# Patient Record
Sex: MALE | Race: ASIAN | NOT HISPANIC OR LATINO | URBAN - METROPOLITAN AREA
[De-identification: names, ages, dates, MRNs, and addresses within clinical notes are randomized per-mention and may not be internally consistent; named-entity substitution may affect disease eponyms.]

---

## 2023-10-20 ENCOUNTER — INPATIENT (INPATIENT)
Facility: HOSPITAL | Age: 38
LOS: 2 days | Discharge: HOME | End: 2023-10-23
Attending: INTERNAL MEDICINE | Admitting: INTERNAL MEDICINE
Payer: COMMERCIAL

## 2023-10-20 VITALS
DIASTOLIC BLOOD PRESSURE: 73 MMHG | TEMPERATURE: 98 F | HEART RATE: 114 BPM | OXYGEN SATURATION: 99 % | SYSTOLIC BLOOD PRESSURE: 137 MMHG | RESPIRATION RATE: 18 BRPM

## 2023-10-20 DIAGNOSIS — E11.9 TYPE 2 DIABETES MELLITUS WITHOUT COMPLICATIONS: ICD-10-CM

## 2023-10-20 DIAGNOSIS — D64.9 ANEMIA, UNSPECIFIED: ICD-10-CM

## 2023-10-20 DIAGNOSIS — H00.019 HORDEOLUM EXTERNUM UNSPECIFIED EYE, UNSPECIFIED EYELID: ICD-10-CM

## 2023-10-20 DIAGNOSIS — E78.5 HYPERLIPIDEMIA, UNSPECIFIED: ICD-10-CM

## 2023-10-20 DIAGNOSIS — K92.2 GASTROINTESTINAL HEMORRHAGE, UNSPECIFIED: ICD-10-CM

## 2023-10-20 DIAGNOSIS — Z29.9 ENCOUNTER FOR PROPHYLACTIC MEASURES, UNSPECIFIED: ICD-10-CM

## 2023-10-20 LAB
ALBUMIN SERPL ELPH-MCNC: 4.8 G/DL — SIGNIFICANT CHANGE UP (ref 3.3–5)
ALBUMIN SERPL ELPH-MCNC: 4.8 G/DL — SIGNIFICANT CHANGE UP (ref 3.3–5)
ALP SERPL-CCNC: 107 U/L — SIGNIFICANT CHANGE UP (ref 40–120)
ALP SERPL-CCNC: 107 U/L — SIGNIFICANT CHANGE UP (ref 40–120)
ALT FLD-CCNC: 16 U/L — SIGNIFICANT CHANGE UP (ref 4–41)
ALT FLD-CCNC: 16 U/L — SIGNIFICANT CHANGE UP (ref 4–41)
ANION GAP SERPL CALC-SCNC: 9 MMOL/L — SIGNIFICANT CHANGE UP (ref 7–14)
ANION GAP SERPL CALC-SCNC: 9 MMOL/L — SIGNIFICANT CHANGE UP (ref 7–14)
APTT BLD: 27.2 SEC — SIGNIFICANT CHANGE UP (ref 24.5–35.6)
APTT BLD: 27.2 SEC — SIGNIFICANT CHANGE UP (ref 24.5–35.6)
AST SERPL-CCNC: 20 U/L — SIGNIFICANT CHANGE UP (ref 4–40)
AST SERPL-CCNC: 20 U/L — SIGNIFICANT CHANGE UP (ref 4–40)
BASOPHILS # BLD AUTO: 0 K/UL — SIGNIFICANT CHANGE UP (ref 0–0.2)
BASOPHILS # BLD AUTO: 0 K/UL — SIGNIFICANT CHANGE UP (ref 0–0.2)
BASOPHILS NFR BLD AUTO: 0 % — SIGNIFICANT CHANGE UP (ref 0–2)
BASOPHILS NFR BLD AUTO: 0 % — SIGNIFICANT CHANGE UP (ref 0–2)
BILIRUB SERPL-MCNC: 0.3 MG/DL — SIGNIFICANT CHANGE UP (ref 0.2–1.2)
BILIRUB SERPL-MCNC: 0.3 MG/DL — SIGNIFICANT CHANGE UP (ref 0.2–1.2)
BLD GP AB SCN SERPL QL: NEGATIVE — SIGNIFICANT CHANGE UP
BLD GP AB SCN SERPL QL: NEGATIVE — SIGNIFICANT CHANGE UP
BUN SERPL-MCNC: 14 MG/DL — SIGNIFICANT CHANGE UP (ref 7–23)
BUN SERPL-MCNC: 14 MG/DL — SIGNIFICANT CHANGE UP (ref 7–23)
CALCIUM SERPL-MCNC: 9.2 MG/DL — SIGNIFICANT CHANGE UP (ref 8.4–10.5)
CALCIUM SERPL-MCNC: 9.2 MG/DL — SIGNIFICANT CHANGE UP (ref 8.4–10.5)
CHLORIDE SERPL-SCNC: 100 MMOL/L — SIGNIFICANT CHANGE UP (ref 98–107)
CHLORIDE SERPL-SCNC: 100 MMOL/L — SIGNIFICANT CHANGE UP (ref 98–107)
CO2 SERPL-SCNC: 26 MMOL/L — SIGNIFICANT CHANGE UP (ref 22–31)
CO2 SERPL-SCNC: 26 MMOL/L — SIGNIFICANT CHANGE UP (ref 22–31)
CREAT SERPL-MCNC: 0.59 MG/DL — SIGNIFICANT CHANGE UP (ref 0.5–1.3)
CREAT SERPL-MCNC: 0.59 MG/DL — SIGNIFICANT CHANGE UP (ref 0.5–1.3)
EGFR: 128 ML/MIN/1.73M2 — SIGNIFICANT CHANGE UP
EGFR: 128 ML/MIN/1.73M2 — SIGNIFICANT CHANGE UP
EOSINOPHIL # BLD AUTO: 0.35 K/UL — SIGNIFICANT CHANGE UP (ref 0–0.5)
EOSINOPHIL # BLD AUTO: 0.35 K/UL — SIGNIFICANT CHANGE UP (ref 0–0.5)
EOSINOPHIL NFR BLD AUTO: 4.3 % — SIGNIFICANT CHANGE UP (ref 0–6)
EOSINOPHIL NFR BLD AUTO: 4.3 % — SIGNIFICANT CHANGE UP (ref 0–6)
FERRITIN SERPL-MCNC: 9 NG/ML — LOW (ref 30–400)
FERRITIN SERPL-MCNC: 9 NG/ML — LOW (ref 30–400)
GLUCOSE BLDC GLUCOMTR-MCNC: 130 MG/DL — HIGH (ref 70–99)
GLUCOSE BLDC GLUCOMTR-MCNC: 130 MG/DL — HIGH (ref 70–99)
GLUCOSE BLDC GLUCOMTR-MCNC: 141 MG/DL — HIGH (ref 70–99)
GLUCOSE BLDC GLUCOMTR-MCNC: 141 MG/DL — HIGH (ref 70–99)
GLUCOSE SERPL-MCNC: 181 MG/DL — HIGH (ref 70–99)
GLUCOSE SERPL-MCNC: 181 MG/DL — HIGH (ref 70–99)
HAPTOGLOB SERPL-MCNC: 168 MG/DL — SIGNIFICANT CHANGE UP (ref 34–200)
HAPTOGLOB SERPL-MCNC: 168 MG/DL — SIGNIFICANT CHANGE UP (ref 34–200)
HCT VFR BLD CALC: 25.1 % — LOW (ref 39–50)
HCT VFR BLD CALC: 25.1 % — LOW (ref 39–50)
HGB BLD-MCNC: 7.3 G/DL — LOW (ref 13–17)
HGB BLD-MCNC: 7.3 G/DL — LOW (ref 13–17)
IANC: 5.52 K/UL — SIGNIFICANT CHANGE UP (ref 1.8–7.4)
IANC: 5.52 K/UL — SIGNIFICANT CHANGE UP (ref 1.8–7.4)
INR BLD: 1.06 RATIO — SIGNIFICANT CHANGE UP (ref 0.85–1.18)
INR BLD: 1.06 RATIO — SIGNIFICANT CHANGE UP (ref 0.85–1.18)
IRON SATN MFR SERPL: 16 UG/DL — LOW (ref 45–165)
IRON SATN MFR SERPL: 16 UG/DL — LOW (ref 45–165)
IRON SATN MFR SERPL: 4 % — LOW (ref 14–50)
IRON SATN MFR SERPL: 4 % — LOW (ref 14–50)
LYMPHOCYTES # BLD AUTO: 1.43 K/UL — SIGNIFICANT CHANGE UP (ref 1–3.3)
LYMPHOCYTES # BLD AUTO: 1.43 K/UL — SIGNIFICANT CHANGE UP (ref 1–3.3)
LYMPHOCYTES # BLD AUTO: 17.4 % — SIGNIFICANT CHANGE UP (ref 13–44)
LYMPHOCYTES # BLD AUTO: 17.4 % — SIGNIFICANT CHANGE UP (ref 13–44)
MCHC RBC-ENTMCNC: 21.5 PG — LOW (ref 27–34)
MCHC RBC-ENTMCNC: 21.5 PG — LOW (ref 27–34)
MCHC RBC-ENTMCNC: 29.1 GM/DL — LOW (ref 32–36)
MCHC RBC-ENTMCNC: 29.1 GM/DL — LOW (ref 32–36)
MCV RBC AUTO: 74 FL — LOW (ref 80–100)
MCV RBC AUTO: 74 FL — LOW (ref 80–100)
MONOCYTES # BLD AUTO: 0.21 K/UL — SIGNIFICANT CHANGE UP (ref 0–0.9)
MONOCYTES # BLD AUTO: 0.21 K/UL — SIGNIFICANT CHANGE UP (ref 0–0.9)
MONOCYTES NFR BLD AUTO: 2.6 % — SIGNIFICANT CHANGE UP (ref 2–14)
MONOCYTES NFR BLD AUTO: 2.6 % — SIGNIFICANT CHANGE UP (ref 2–14)
NEUTROPHILS # BLD AUTO: 6.13 K/UL — SIGNIFICANT CHANGE UP (ref 1.8–7.4)
NEUTROPHILS # BLD AUTO: 6.13 K/UL — SIGNIFICANT CHANGE UP (ref 1.8–7.4)
NEUTROPHILS NFR BLD AUTO: 74.8 % — SIGNIFICANT CHANGE UP (ref 43–77)
NEUTROPHILS NFR BLD AUTO: 74.8 % — SIGNIFICANT CHANGE UP (ref 43–77)
PLATELET # BLD AUTO: 216 K/UL — SIGNIFICANT CHANGE UP (ref 150–400)
PLATELET # BLD AUTO: 216 K/UL — SIGNIFICANT CHANGE UP (ref 150–400)
POTASSIUM SERPL-MCNC: 3.8 MMOL/L — SIGNIFICANT CHANGE UP (ref 3.5–5.3)
POTASSIUM SERPL-MCNC: 3.8 MMOL/L — SIGNIFICANT CHANGE UP (ref 3.5–5.3)
POTASSIUM SERPL-SCNC: 3.8 MMOL/L — SIGNIFICANT CHANGE UP (ref 3.5–5.3)
POTASSIUM SERPL-SCNC: 3.8 MMOL/L — SIGNIFICANT CHANGE UP (ref 3.5–5.3)
PROT SERPL-MCNC: 7.9 G/DL — SIGNIFICANT CHANGE UP (ref 6–8.3)
PROT SERPL-MCNC: 7.9 G/DL — SIGNIFICANT CHANGE UP (ref 6–8.3)
PROTHROM AB SERPL-ACNC: 11.9 SEC — SIGNIFICANT CHANGE UP (ref 9.5–13)
PROTHROM AB SERPL-ACNC: 11.9 SEC — SIGNIFICANT CHANGE UP (ref 9.5–13)
RBC # BLD: 3.39 M/UL — LOW (ref 4.2–5.8)
RBC # BLD: 3.39 M/UL — LOW (ref 4.2–5.8)
RBC # FLD: 17.3 % — HIGH (ref 10.3–14.5)
RBC # FLD: 17.3 % — HIGH (ref 10.3–14.5)
RH IG SCN BLD-IMP: POSITIVE — SIGNIFICANT CHANGE UP
SODIUM SERPL-SCNC: 135 MMOL/L — SIGNIFICANT CHANGE UP (ref 135–145)
SODIUM SERPL-SCNC: 135 MMOL/L — SIGNIFICANT CHANGE UP (ref 135–145)
TIBC SERPL-MCNC: 453 UG/DL — HIGH (ref 220–430)
TIBC SERPL-MCNC: 453 UG/DL — HIGH (ref 220–430)
UIBC SERPL-MCNC: 437 UG/DL — HIGH (ref 110–370)
UIBC SERPL-MCNC: 437 UG/DL — HIGH (ref 110–370)
WBC # BLD: 8.2 K/UL — SIGNIFICANT CHANGE UP (ref 3.8–10.5)
WBC # BLD: 8.2 K/UL — SIGNIFICANT CHANGE UP (ref 3.8–10.5)
WBC # FLD AUTO: 8.2 K/UL — SIGNIFICANT CHANGE UP (ref 3.8–10.5)
WBC # FLD AUTO: 8.2 K/UL — SIGNIFICANT CHANGE UP (ref 3.8–10.5)

## 2023-10-20 PROCEDURE — 99222 1ST HOSP IP/OBS MODERATE 55: CPT

## 2023-10-20 PROCEDURE — 99223 1ST HOSP IP/OBS HIGH 75: CPT

## 2023-10-20 PROCEDURE — 99291 CRITICAL CARE FIRST HOUR: CPT

## 2023-10-20 RX ORDER — ERYTHROMYCIN BASE 5 MG/GRAM
1 OINTMENT (GRAM) OPHTHALMIC (EYE)
Refills: 0 | Status: DISCONTINUED | OUTPATIENT
Start: 2023-10-20 | End: 2023-10-23

## 2023-10-20 RX ORDER — FERROUS SULFATE 325(65) MG
325 TABLET ORAL DAILY
Refills: 0 | Status: DISCONTINUED | OUTPATIENT
Start: 2023-10-20 | End: 2023-10-21

## 2023-10-20 RX ORDER — EMPAGLIFLOZIN 10 MG/1
1 TABLET, FILM COATED ORAL
Refills: 0 | DISCHARGE

## 2023-10-20 RX ORDER — GLUCAGON INJECTION, SOLUTION 0.5 MG/.1ML
1 INJECTION, SOLUTION SUBCUTANEOUS ONCE
Refills: 0 | Status: DISCONTINUED | OUTPATIENT
Start: 2023-10-20 | End: 2023-10-23

## 2023-10-20 RX ORDER — DEXTROSE 50 % IN WATER 50 %
12.5 SYRINGE (ML) INTRAVENOUS ONCE
Refills: 0 | Status: DISCONTINUED | OUTPATIENT
Start: 2023-10-20 | End: 2023-10-23

## 2023-10-20 RX ORDER — DEXTROSE 50 % IN WATER 50 %
25 SYRINGE (ML) INTRAVENOUS ONCE
Refills: 0 | Status: DISCONTINUED | OUTPATIENT
Start: 2023-10-20 | End: 2023-10-23

## 2023-10-20 RX ORDER — DEXTROSE 50 % IN WATER 50 %
15 SYRINGE (ML) INTRAVENOUS ONCE
Refills: 0 | Status: DISCONTINUED | OUTPATIENT
Start: 2023-10-20 | End: 2023-10-23

## 2023-10-20 RX ORDER — INSULIN LISPRO 100/ML
VIAL (ML) SUBCUTANEOUS AT BEDTIME
Refills: 0 | Status: DISCONTINUED | OUTPATIENT
Start: 2023-10-20 | End: 2023-10-23

## 2023-10-20 RX ORDER — SODIUM CHLORIDE 9 MG/ML
1000 INJECTION INTRAMUSCULAR; INTRAVENOUS; SUBCUTANEOUS ONCE
Refills: 0 | Status: COMPLETED | OUTPATIENT
Start: 2023-10-20 | End: 2023-10-20

## 2023-10-20 RX ORDER — SITAGLIPTIN AND METFORMIN HYDROCHLORIDE 500; 50 MG/1; MG/1
1 TABLET, FILM COATED ORAL
Refills: 0 | DISCHARGE

## 2023-10-20 RX ORDER — ATORVASTATIN CALCIUM 80 MG/1
40 TABLET, FILM COATED ORAL AT BEDTIME
Refills: 0 | Status: DISCONTINUED | OUTPATIENT
Start: 2023-10-20 | End: 2023-10-23

## 2023-10-20 RX ORDER — INSULIN LISPRO 100/ML
VIAL (ML) SUBCUTANEOUS
Refills: 0 | Status: DISCONTINUED | OUTPATIENT
Start: 2023-10-20 | End: 2023-10-23

## 2023-10-20 RX ORDER — ROSUVASTATIN CALCIUM 5 MG/1
1 TABLET ORAL
Refills: 0 | DISCHARGE

## 2023-10-20 RX ADMIN — SODIUM CHLORIDE 1000 MILLILITER(S): 9 INJECTION INTRAMUSCULAR; INTRAVENOUS; SUBCUTANEOUS at 12:37

## 2023-10-20 RX ADMIN — ATORVASTATIN CALCIUM 40 MILLIGRAM(S): 80 TABLET, FILM COATED ORAL at 22:43

## 2023-10-20 NOTE — ED PROVIDER NOTE - PROGRESS NOTE DETAILS
ROBERTA Urbano: Pt consented for blood transfusion given Hgb 7.3, tachycardia and concern for GI bleed. GI emailed for consult. Plan for admission

## 2023-10-20 NOTE — H&P ADULT - HISTORY OF PRESENT ILLNESS
38-year-old male with PMH of DM2, HLD sent to the ED for hemoglobin 7.6.  Pt states he has been having BRBPR intermittently for apx 5 months. Typically sees blood w/ wiping and in the toilet bowl. Denies pain w/  BM or w/ bleeding episodes. Reports hx of hemorroids which pt attributed sx to. Was evaluated by outaptient provider last month, and was found to have iron def anemia (Hgb apx 9 at that time). Started on iron supplementation. Re-evaluated by GI (Dr. Gallegos) yesterday given persistent episodes of BRBPR, Blood work obtained and pt planned for outpatient colonoscopy 10/25. States he was called and asked to come in to the E.D. for CBC sig for Hgb 7.6 down from 9. At this time pt denies hemoptysis/hematemesis, dizziness,lightheadedness, chest pain or SOB, constipation, diarrhea. Noted L lower eye lid swelling yesterday w. some pain/TTP. ROS otherwise negative.    In the E.D, pt hemodynamically stable. Hgb 7.3. GI consulted. Admitted to medicine for further mgt.

## 2023-10-20 NOTE — H&P ADULT - NSHPREVIEWOFSYSTEMS_GEN_ALL_CORE
CONSTITUTIONAL:  No weight loss, fever, chills, weakness or fatigue.  HEENT:    No visual loss, blurred vision, No hearing loss, sneezing, congestion, runny nose or sore throat.  SKIN:  No rash or itching.  CARDIOVASCULAR:  No chest pain or chest discomfort. No palpitations.  RESPIRATORY:  No shortness of breath, cough or sputum.  GASTROINTESTINAL:  No nausea, vomiting or diarrhea. No abdominal pain, +BRBPR  GENITOURINARY:  Denies hematuria, dysuria.   NEUROLOGICAL:  No headache, dizziness, numbness or tingling in the extremities. No change in bowel or bladder control.  MUSCULOSKELETAL:  No muscle, back pain, joint pain or stiffness.  HEMATOLOGIC:  No  bleeding or bruising.  ENDOCRINOLOGIC:  No reports of sweating, cold or heat intolerance. No polyuria or polydipsia.

## 2023-10-20 NOTE — ED ADULT NURSE NOTE - OBJECTIVE STATEMENT
Receive pt in intake alert and oriented x  c/o rectal bleed.  Denies abd pain, N/V/D, constipation dizziness.  Resp even and unlabored.  Skin intact.  IV 20 G RAC placed. Labs sent

## 2023-10-20 NOTE — H&P ADULT - PROBLEM SELECTOR PLAN 2
--Noted in L eye. Improving per pt  --Resume warm compress  --Topical erythromycin x 7 days  --Continue to monitor

## 2023-10-20 NOTE — H&P ADULT - NSHPPHYSICALEXAM_GEN_ALL_CORE
GENERAL: NAD  HEAD:  Atraumatic, Normocephalic  EYES: EOMI, conjunctiva and sclera clear. +PEERLA, +L lower eyelid erythema/swelling, mild TTP  NECK: Supple  CHEST/LUNG: Clear to auscultation bilaterally; No wheeze, rhonchi, crackles or rales  HEART: Regular rate and rhythm; No murmurs, rubs, or gallops  ABDOMEN: Soft, Nontender, Nondistended; Bowel sounds present  EXTREMITIES:  2+ Peripheral Pulses, No edema  PSYCH: AAOx3  NEUROLOGY: non-focal  SKIN: Warm and dry

## 2023-10-20 NOTE — H&P ADULT - NSHPLABSRESULTS_GEN_ALL_CORE
7.3    8.20  )-----------( 216      ( 20 Oct 2023 12:30 )             25.1       10-20    135  |  100  |  14  ----------------------------<  181<H>  3.8   |  26  |  0.59    Ca    9.2      20 Oct 2023 12:30    TPro  7.9  /  Alb  4.8  /  TBili  0.3  /  DBili  x   /  AST  20  /  ALT  16  /  AlkPhos  107  10-20              Urinalysis Basic - ( 20 Oct 2023 12:30 )    Color: x / Appearance: x / SG: x / pH: x  Gluc: 181 mg/dL / Ketone: x  / Bili: x / Urobili: x   Blood: x / Protein: x / Nitrite: x   Leuk Esterase: x / RBC: x / WBC x   Sq Epi: x / Non Sq Epi: x / Bacteria: x        PT/INR - ( 20 Oct 2023 12:30 )   PT: 11.9 sec;   INR: 1.06 ratio         PTT - ( 20 Oct 2023 12:30 )  PTT:27.2 sec          CAPILLARY BLOOD GLUCOSE      POCT Blood Glucose.: 130 mg/dL (20 Oct 2023 17:34)              RADIOLOGY & ADDITIONAL TESTS:    Imaging personally reviewed.    Consultant(s) notes reviewed.    Care discussed with consultants and other providers.

## 2023-10-20 NOTE — ED PROVIDER NOTE - CLINICAL SUMMARY MEDICAL DECISION MAKING FREE TEXT BOX
38-year-old male with past medical history DM2, HLD sent to the ED with hemoglobin 7.6 in the setting of intermittent rectal bleeding x months. Pt is scheduled for a colonoscopy on 10/25. On exam pt is well appearing, + in triage, non tender abd, pt reports last blood in stool was 3-4 days ago, reports negative occult yesterday. Concern for GI bleed. Plan: cbc/cmp, type and screen, will attempt to discuss with pts GI doctor.

## 2023-10-20 NOTE — ED PROVIDER NOTE - ATTENDING APP SHARED VISIT CONTRIBUTION OF CARE
I have personally performed a face to face medical and diagnostic evaluation of the patient. I have discussed with and reviewed the ALOK's note and agree with the History, ROS, Physical Exam and MDM unless otherwise indicated. A brief summary of my personal evaluation and impression can be found below.      Harriet MACIEL: 38-year-old male, history of diabetes hyperlipidemia, presents with a chief complaint of low hemoglobin as detected on outpatient labs, patient reports he has had a history of intermittent episodes of rectal bleeding, bright red, thought secondary to possible hemorrhoids, is scheduled for colonoscopy on Wednesday of this upcoming week, however was sent to the ED for evaluation in the setting of a low hemoglobin to 7.6 on outpatient labs.  Otherwise patient reports he feels totally fine has no complaints of nausea vomiting fever chills chest pain trouble breathing no lightheadedness or dizziness no fever or chills does not have any bleeding at this time no melanotic stools.  His mother notes that his skin tone appears more pale than normal.  No prior history of transfusion.    All other ROS negative, except as above and as per HPI and ROS section.    VITALS: Initial triage and subsequent vitals have been reviewed by me.  GEN APPEARANCE: Alert, non-toxic, well-appearing, NAD. pale appearing   HEAD: Atraumatic.  EYES: PERRLa, EOMI, vision grossly intact.   NECK: Supple  CV: RRR, S1S2, no c/r/m/g. Cap refill <2 seconds. No bruits.   LUNGS: CTAB. No abnormal breath sounds.  ABDOMEN: Soft, NTND. No guarding or rebound.   MSK/EXT: No spinal or extremity point tenderness. No CVA ttp. Pelvis stable. No peripheral edema.  NEURO: Alert, follows commands. Weight bearing normal. Speech normal. Sensation and motor normal x4 extremities.   SKIN: Warm, dry and intact. No rash.  PSYCH: Appropriate    Plan/MDM: exam tachycardic otherwise vitals stable non focal exam, rectal exam w no gross bleeding c/f possible LGIB, will check basic labs t/s discuss w outpatient team, transfuse as indicated, meds as needed, reassess, dispo accordingly thereafter.

## 2023-10-20 NOTE — ED PROVIDER NOTE - NS ED ATTENDING STATEMENT MOD
This was a shared visit with the ALOK. I reviewed and verified the documentation and independently performed the documented: I have personally provided the amount of critical care time documented below excluding time spent on separate procedures.

## 2023-10-20 NOTE — CONSULT NOTE ADULT - SUBJECTIVE AND OBJECTIVE BOX
Chief Complaint:  Patient is a 38y old  Male who presents with a chief complaint of low hgb on op labs    HPI: 38-year-old male with past medical history DM2, HLD sent to the ED with hemoglobin 7.6.  Patient reports over the past few months he has intermittent episodes of rectal bleeding.  Patient states he saw his primary care doctor and a GI doctor yesterday, has a colonoscopy scheduled for next week on 10/25, had blood work drawn yesterday but was called today that his hemoglobin was 7.6 and instructed to come to the ER.  Patient reports approximately 2 months ago his hemoglobin was 9.2. last bloody BM 3-4 days ago    GI consulted for rectal bleeding. pt seen and examined.      currently afebrile tachy to 114 normotensive  microcytic anemia w/ mcv 70s, initial hgb 7.3 (from reported ~9 2 months prior)  no abd imaging    Allergies:  No Known Allergies      PMHX/PSHX:      DM2 (diabetes mellitus, type 2)    HLD (hyperlipidemia)    No significant past surgical history        Family history:  as above    Social History: as above    Home Medications: reviewed w/ pt      ROS:   General:  AS PER HPI  Eyes:  Adequate vision, no reported pain  ENT:  No sore throat, runny nose, dysphagia  CV:  No chest pain, palpitations  Pulm:  No dyspnea, cough, tachypnea, wheezing  GI:  AS PER HPI  :  No pain, bleeding, burning  Muscle:  No pain, weakness  Neuro:  No tingling, numbness, memory problems  Psych:  No insomnia, mood problems, depression  Endocrine:  No polyuria, cold/heat intolerance  Heme:  No petechiae, ecchymosis, easy bruisability  Skin:  No rash, edema      Vital Signs:  Vital Signs Last 24 Hrs  T(C): 36.9 (20 Oct 2023 10:30), Max: 36.9 (20 Oct 2023 10:30)  T(F): 98.4 (20 Oct 2023 10:30), Max: 98.4 (20 Oct 2023 10:30)  HR: 114 (20 Oct 2023 10:30) (114 - 114)  BP: 137/73 (20 Oct 2023 10:30) (137/73 - 137/73)  BP(mean): --  RR: 18 (20 Oct 2023 10:30) (18 - 18)  SpO2: 99% (20 Oct 2023 10:30) (99% - 99%)    Parameters below as of 20 Oct 2023 10:30  Patient On (Oxygen Delivery Method): room air      Daily     Daily     LABS:                        7.3    8.20  )-----------( 216      ( 20 Oct 2023 12:30 )             25.1     Mean Cell Volume: 74.0 fL (10-20-23 @ 12:30)    10-20    135  |  100  |  14  ----------------------------<  181<H>  3.8   |  26  |  0.59    Ca    9.2      20 Oct 2023 12:30    TPro  7.9  /  Alb  4.8  /  TBili  0.3  /  DBili  x   /  AST  20  /  ALT  16  /  AlkPhos  107  10-20    LIVER FUNCTIONS - ( 20 Oct 2023 12:30 )  Alb: 4.8 g/dL / Pro: 7.9 g/dL / ALK PHOS: 107 U/L / ALT: 16 U/L / AST: 20 U/L / GGT: x           PT/INR - ( 20 Oct 2023 12:30 )   PT: 11.9 sec;   INR: 1.06 ratio         PTT - ( 20 Oct 2023 12:30 )  PTT:27.2 sec  Urinalysis Basic - ( 20 Oct 2023 12:30 )    Color: x / Appearance: x / SG: x / pH: x  Gluc: 181 mg/dL / Ketone: x  / Bili: x / Urobili: x   Blood: x / Protein: x / Nitrite: x   Leuk Esterase: x / RBC: x / WBC x   Sq Epi: x / Non Sq Epi: x / Bacteria: x                              7.3    8.20  )-----------( 216      ( 20 Oct 2023 12:30 )             25.1       PHYSICAL EXAM:   GENERAL:  Lying in bed in NAD  HEENT:  Normocephalic/atraumatic, no scleral icterus  NECK: Trachea midline  CHEST:  Resp even, non labored   ABDOMEN:  Soft, non-tender, non-distended  EXTREMITIES: WWP, no edema  SKIN:  No rash or jaundice  NEURO:  Alert and oriented x 3, no asterixis    Imaging: no abd imaging           Chief Complaint:  Patient is a 38y old  Male who presents with a chief complaint of low hgb on op labs    HPI: 38-year-old male with past medical history DM2, HLD sent to the ED with hemoglobin 7.6.  Patient reports over the past few months he has intermittent episodes of rectal bleeding.      GI consulted for rectal bleeding. pt seen and examined. mom at bedside. initially planned for op colon this wednesday w dr tamera ivey for rectal bleeding, had hgb checked at pmd office, noted to be low so sent to hospital, states fobt yesterday in office negative. c/o intermittent rectal bleeding for the past 3 months, stools described as brown, w/ 'spraying' of small to moderate amounts of brb in toilet and blood streaking on stool, denies large volume hematochezia. no cge/hematemesis or overt melena. no constipation or straining. ~1yr prior hgb in 13 range, 2 mos ago in 9 range, recently started on iron supplement as per pmd. denies prior hx of gib or need for blood transfusion. denies f/c, n/v/abd pain, diarrhea, or weight loss. has never had egd/colon. no prior abd sx. fhx  nc for gi tract cancers or ibd. meds- no ac, ap, or significant nsaid use, but on jardiance- last use 10/19. denies current toxic habits, prior hx of marijuana use.    currently afebrile tachy to 114 normotensive  microcytic anemia w/ mcv 70s, initial hgb 7.3 (from reported ~9 2 months prior)  no abd imaging    Allergies:  No Known Allergies      PMHX/PSHX:      DM2 (diabetes mellitus, type 2)    HLD (hyperlipidemia)    No significant past surgical history        Family history:  as above    Social History: as above    Home Medications: reviewed w/ pt      ROS:   General:  AS PER HPI  Eyes:  Adequate vision, no reported pain  ENT:  No sore throat, runny nose, dysphagia  CV:  No chest pain, palpitations  Pulm:  No dyspnea, cough, tachypnea, wheezing  GI:  AS PER HPI  :  No pain, bleeding, burning  Muscle:  No pain, weakness  Neuro:  No tingling, numbness, memory problems  Psych:  No insomnia, mood problems, depression  Endocrine:  No polyuria, cold/heat intolerance  Heme:  No petechiae, ecchymosis, easy bruisability  Skin:  No rash, edema      Vital Signs:  Vital Signs Last 24 Hrs  T(C): 36.9 (20 Oct 2023 10:30), Max: 36.9 (20 Oct 2023 10:30)  T(F): 98.4 (20 Oct 2023 10:30), Max: 98.4 (20 Oct 2023 10:30)  HR: 114 (20 Oct 2023 10:30) (114 - 114)  BP: 137/73 (20 Oct 2023 10:30) (137/73 - 137/73)  BP(mean): --  RR: 18 (20 Oct 2023 10:30) (18 - 18)  SpO2: 99% (20 Oct 2023 10:30) (99% - 99%)    Parameters below as of 20 Oct 2023 10:30  Patient On (Oxygen Delivery Method): room air      Daily     Daily     LABS:                        7.3    8.20  )-----------( 216      ( 20 Oct 2023 12:30 )             25.1     Mean Cell Volume: 74.0 fL (10-20-23 @ 12:30)    10-20    135  |  100  |  14  ----------------------------<  181<H>  3.8   |  26  |  0.59    Ca    9.2      20 Oct 2023 12:30    TPro  7.9  /  Alb  4.8  /  TBili  0.3  /  DBili  x   /  AST  20  /  ALT  16  /  AlkPhos  107  10-20    LIVER FUNCTIONS - ( 20 Oct 2023 12:30 )  Alb: 4.8 g/dL / Pro: 7.9 g/dL / ALK PHOS: 107 U/L / ALT: 16 U/L / AST: 20 U/L / GGT: x           PT/INR - ( 20 Oct 2023 12:30 )   PT: 11.9 sec;   INR: 1.06 ratio         PTT - ( 20 Oct 2023 12:30 )  PTT:27.2 sec  Urinalysis Basic - ( 20 Oct 2023 12:30 )    Color: x / Appearance: x / SG: x / pH: x  Gluc: 181 mg/dL / Ketone: x  / Bili: x / Urobili: x   Blood: x / Protein: x / Nitrite: x   Leuk Esterase: x / RBC: x / WBC x   Sq Epi: x / Non Sq Epi: x / Bacteria: x                              7.3    8.20  )-----------( 216      ( 20 Oct 2023 12:30 )             25.1       PHYSICAL EXAM:   GENERAL: lying in bed, pale appearing, in no apparent distress  HEENT: NCAT  EYES: anicteric sclerae, moist conjunctivae  NECK: trachea midline, FROM  CHEST:  respirations even, non labored  ABDOMEN:  soft, non-tender, non-distended, no RT/no guarding; KURT deferred as in ED hallway, as per ED KURT no gross bleeding  EXTREMITIES: WWP, no edema  SKIN:  warm/dry, no visible rash appreciated  PSYCH: appropriate affect, A&O x 3  NEURO: no tremor noted     Imaging: no abd imaging           Chief Complaint:  Patient is a 38y old  Male who presents with a chief complaint of low hgb on op labs    HPI: 38-year-old male with past medical history DM2, HLD sent to the ED with hemoglobin 7.6.  Patient reports over the past few months he has intermittent episodes of rectal bleeding.      GI consulted for rectal bleeding. pt seen and examined. mom at bedside. initially planned for op colon this wednesday w dr tamera ivey for rectal bleeding, had hgb checked at pmd office, noted to be low so sent to hospital, states fobt yesterday in office negative. c/o intermittent rectal bleeding for the past 3 months (2-3x/wk), stools described as brown, w/ 'spraying' of small to moderate amounts of brb in toilet and blood streaking on stool, denies large volume hematochezia. no cge/hematemesis or overt melena. no constipation or straining. associated mild fatigue. ~1yr prior hgb in 13 range, 2 mos ago in 9 range, recently started on iron supplement as per pmd. denies prior hx of gib or need for blood transfusion. denies cp sob, vasquez (reports exercising at baseline- running and wt lifting). denies f/c, n/v/abd pain, diarrhea, or weight loss. has never had egd/colon. no prior abd sx. fhx  nc for gi tract cancers or ibd. meds- no ac, ap, or significant nsaid use, but on jardiance- last use 10/19. denies current toxic habits, prior hx of marijuana use.    currently afebrile tachy to 114 normotensive  microcytic anemia w/ mcv 70s, initial hgb 7.3 (from reported ~9 2 months prior)  no abd imaging    Allergies:  No Known Allergies      PMHX/PSHX:      DM2 (diabetes mellitus, type 2)    HLD (hyperlipidemia)    No significant past surgical history        Family history:  as above    Social History: as above    Home Medications: reviewed w/ pt      ROS:   General:  AS PER HPI  Eyes:  Adequate vision, no reported pain  ENT:  No sore throat, runny nose, dysphagia  CV:  No chest pain, palpitations  Pulm:  No dyspnea, cough, tachypnea, wheezing  GI:  AS PER HPI  :  No pain, bleeding, burning  Muscle:  No pain, weakness  Neuro:  No tingling, numbness, memory problems  Psych:  No insomnia, mood problems, depression  Endocrine:  No polyuria, cold/heat intolerance  Heme:  No petechiae, ecchymosis, easy bruisability  Skin:  No rash, edema      Vital Signs:  Vital Signs Last 24 Hrs  T(C): 36.9 (20 Oct 2023 10:30), Max: 36.9 (20 Oct 2023 10:30)  T(F): 98.4 (20 Oct 2023 10:30), Max: 98.4 (20 Oct 2023 10:30)  HR: 114 (20 Oct 2023 10:30) (114 - 114)  BP: 137/73 (20 Oct 2023 10:30) (137/73 - 137/73)  BP(mean): --  RR: 18 (20 Oct 2023 10:30) (18 - 18)  SpO2: 99% (20 Oct 2023 10:30) (99% - 99%)    Parameters below as of 20 Oct 2023 10:30  Patient On (Oxygen Delivery Method): room air      Daily     Daily     LABS:                        7.3    8.20  )-----------( 216      ( 20 Oct 2023 12:30 )             25.1     Mean Cell Volume: 74.0 fL (10-20-23 @ 12:30)    10-20    135  |  100  |  14  ----------------------------<  181<H>  3.8   |  26  |  0.59    Ca    9.2      20 Oct 2023 12:30    TPro  7.9  /  Alb  4.8  /  TBili  0.3  /  DBili  x   /  AST  20  /  ALT  16  /  AlkPhos  107  10-20    LIVER FUNCTIONS - ( 20 Oct 2023 12:30 )  Alb: 4.8 g/dL / Pro: 7.9 g/dL / ALK PHOS: 107 U/L / ALT: 16 U/L / AST: 20 U/L / GGT: x           PT/INR - ( 20 Oct 2023 12:30 )   PT: 11.9 sec;   INR: 1.06 ratio         PTT - ( 20 Oct 2023 12:30 )  PTT:27.2 sec  Urinalysis Basic - ( 20 Oct 2023 12:30 )    Color: x / Appearance: x / SG: x / pH: x  Gluc: 181 mg/dL / Ketone: x  / Bili: x / Urobili: x   Blood: x / Protein: x / Nitrite: x   Leuk Esterase: x / RBC: x / WBC x   Sq Epi: x / Non Sq Epi: x / Bacteria: x                              7.3    8.20  )-----------( 216      ( 20 Oct 2023 12:30 )             25.1       PHYSICAL EXAM:   GENERAL: lying in bed, pale appearing, in no apparent distress  HEENT: NCAT  EYES: anicteric sclerae, moist conjunctivae  NECK: trachea midline, FROM  CHEST:  respirations even, non labored  ABDOMEN:  soft, non-tender, non-distended, no RT/no guarding; KURT deferred as in ED hallway, as per ED KURT no gross bleeding  EXTREMITIES: WWP, no edema  SKIN:  warm/dry, no visible rash appreciated  PSYCH: appropriate affect, A&O x 3  NEURO: no tremor noted     Imaging: no abd imaging           Chief Complaint:  Patient is a 38y old  Male who presents with a chief complaint of low hgb on op labs    HPI: 38-year-old male with past medical history DM2, HLD sent to the ED with hemoglobin 7.6.  Patient reports over the past few months he has intermittent episodes of rectal bleeding.      GI consulted for rectal bleeding. pt seen and examined. mom at bedside. initially planned for op colon this wednesday w dr tamera ivey for rectal bleeding, had hgb checked at pmd office, noted to be low so sent to hospital, states fobt yesterday in office negative. c/o intermittent rectal bleeding for the past 3 months (2-3x/wk), stools described as brown, w/ 'spraying' of small to moderate amounts of brb in toilet and blood streaking on stool, denies large volume hematochezia. no cge/hematemesis or overt melena. no constipation or straining. associated mild fatigue. last bloody stool 2-3 days ago, last stool brown. ~1yr prior hgb in 13 range, 2 mos ago in 9 range, recently started on iron supplement as per pmd. denies prior hx of gib or need for blood transfusion. denies cp sob, vasquez (reports exercising at baseline- running and wt lifting). denies f/c, n/v/abd pain, diarrhea, or weight loss. has never had egd/colon. no prior abd sx. fhx  nc for gi tract cancers or ibd. meds- no ac, ap, or significant nsaid use, but on jardiance- last use 10/19. denies current toxic habits, prior hx of marijuana use.    currently afebrile tachy to 114 normotensive  microcytic anemia w/ mcv 70s, initial hgb 7.3 (from reported ~9 2 months prior)  no abd imaging    Allergies:  No Known Allergies      PMHX/PSHX:      DM2 (diabetes mellitus, type 2)    HLD (hyperlipidemia)    No significant past surgical history        Family history:  as above    Social History: as above    Home Medications: reviewed w/ pt      ROS:   General:  AS PER HPI  Eyes:  Adequate vision, no reported pain  ENT:  No sore throat, runny nose, dysphagia  CV:  No chest pain, palpitations  Pulm:  No dyspnea, cough, tachypnea, wheezing  GI:  AS PER HPI  :  No pain, bleeding, burning  Muscle:  No pain, weakness  Neuro:  No tingling, numbness, memory problems  Psych:  No insomnia, mood problems, depression  Endocrine:  No polyuria, cold/heat intolerance  Heme:  No petechiae, ecchymosis, easy bruisability  Skin:  No rash, edema      Vital Signs:  Vital Signs Last 24 Hrs  T(C): 36.9 (20 Oct 2023 10:30), Max: 36.9 (20 Oct 2023 10:30)  T(F): 98.4 (20 Oct 2023 10:30), Max: 98.4 (20 Oct 2023 10:30)  HR: 114 (20 Oct 2023 10:30) (114 - 114)  BP: 137/73 (20 Oct 2023 10:30) (137/73 - 137/73)  BP(mean): --  RR: 18 (20 Oct 2023 10:30) (18 - 18)  SpO2: 99% (20 Oct 2023 10:30) (99% - 99%)    Parameters below as of 20 Oct 2023 10:30  Patient On (Oxygen Delivery Method): room air      Daily     Daily     LABS:                        7.3    8.20  )-----------( 216      ( 20 Oct 2023 12:30 )             25.1     Mean Cell Volume: 74.0 fL (10-20-23 @ 12:30)    10-20    135  |  100  |  14  ----------------------------<  181<H>  3.8   |  26  |  0.59    Ca    9.2      20 Oct 2023 12:30    TPro  7.9  /  Alb  4.8  /  TBili  0.3  /  DBili  x   /  AST  20  /  ALT  16  /  AlkPhos  107  10-20    LIVER FUNCTIONS - ( 20 Oct 2023 12:30 )  Alb: 4.8 g/dL / Pro: 7.9 g/dL / ALK PHOS: 107 U/L / ALT: 16 U/L / AST: 20 U/L / GGT: x           PT/INR - ( 20 Oct 2023 12:30 )   PT: 11.9 sec;   INR: 1.06 ratio         PTT - ( 20 Oct 2023 12:30 )  PTT:27.2 sec  Urinalysis Basic - ( 20 Oct 2023 12:30 )    Color: x / Appearance: x / SG: x / pH: x  Gluc: 181 mg/dL / Ketone: x  / Bili: x / Urobili: x   Blood: x / Protein: x / Nitrite: x   Leuk Esterase: x / RBC: x / WBC x   Sq Epi: x / Non Sq Epi: x / Bacteria: x                              7.3    8.20  )-----------( 216      ( 20 Oct 2023 12:30 )             25.1       PHYSICAL EXAM:   GENERAL: lying in bed, pale appearing, in no apparent distress  HEENT: NCAT  EYES: anicteric sclerae, moist conjunctivae  NECK: trachea midline, FROM  CHEST:  respirations even, non labored  ABDOMEN:  soft, non-tender, non-distended, no RT/no guarding; KURT deferred as in ED hallway, as per ED KURT no gross bleeding  EXTREMITIES: WWP, no edema  SKIN:  warm/dry, no visible rash appreciated  PSYCH: appropriate affect, A&O x 3  NEURO: no tremor noted     Imaging: no abd imaging

## 2023-10-20 NOTE — ED ADULT TRIAGE NOTE - CHIEF COMPLAINT QUOTE
c/o bleeding in rectum fr the past week scheduled for colonoscopy, sent in by PMD for hgb of 7.6. pt's skin color is pale for ethnicity, phx of DM.

## 2023-10-20 NOTE — CONSULT NOTE ADULT - NS ATTEND AMEND GEN_ALL_CORE FT
38M with DM and HL admitted with symptomatic KIN. +rectal bleeding.   Ddx as per PA note  Plan for colonoscopy +/- EGD on Monday to further assess.

## 2023-10-20 NOTE — CONSULT NOTE ADULT - ASSESSMENT
38-year-old male with past medical history DM2, HLD sent to the ED with hemoglobin 7.6 (from 9.2 2 months prior). GI consulted for rectal bleeding. In ED, tachy to 114, otherwise afebrile, normotensive, labs significant for microcytic anemia w/ mcv in 70s, initial hgb 7.3, no abd imaging. 38-year-old male with past medical history DM2, HLD sent to the ED with hemoglobin 7.6 (from 9.2 ~two months prior). GI consulted for rectal bleeding. In ED, tachy to 114, otherwise afebrile, normotensive, labs significant for microcytic anemia w/ mcv in 70s, initial hgb 7.3, no abd imaging.    # intermittent rectal bleeding x 3 months  # microcytic anemia  # tachycardia likely 22 above  # dm2 (last jardiance use 10/19)  - c/o intermittent rectal bleeding for past 3 months w/ hgb below recent baseline and significantly below reported hgb ~1yr prior (~13), described as brown stools w/ brb streaking on stools and small to moderate amounts of brb seen in toilet, no large volume hematochezia, overt melena, or constipation, no prior egd/colon, fhx and meds n/c; r/o malignancy, addtl ddx includes colitis/ibd, outlet bleeding, diverticulosis, avms    Recommendations-  - admit to medicine  - please check iron studies on pre transfusion labs  - follow up post transfusion cbc  - maintain active T&S, adequate IV access  - trend cbc, transfuse for hgb >/= 7   - if active gib/HD instability, obtain CTA AP for localization  - please continue to hold home jardiance while inpt  - will plan for egd/colonoscopy on Monday 10/23, please place on clears on Sunday and keep NPO p MN Sunday night  - GI team to order prep  - rest of care per primary team     dw pt/mother at bedside, ED, gi attg    YULISSA Hartley PA-C, RD, CDN  available on TEAMS for questions  after 5pm/weekends, please contact the on-call GI fellow at [phone]  38-year-old male with past medical history DM2, HLD sent to the ED with hemoglobin 7.6 (from 9.2 ~two months prior). GI consulted for rectal bleeding. In ED, tachy to 114, otherwise afebrile, normotensive, labs significant for microcytic anemia w/ mcv in 70s, initial hgb 7.3, no abd imaging.    # intermittent rectal bleeding x 3 months  # microcytic anemia  # tachycardia likely 2/2 above  # dm2 (last Jardiance use 10/19)  - c/o intermittent rectal bleeding for past 3 months w/ hgb below recent baseline and significantly below reported hgb ~1yr prior (~13), described as brown stools w/ brb streaking on stools and small to moderate amounts of brb seen in toilet, no large volume hematochezia, overt melena, or constipation, no prior egd/colon, fhx and meds n/c; ddx includes colitis/ibd, outlet bleeding, malignancy, diverticulosis, avms    Recommendations-  - admit to medicine  - please check iron studies on pre transfusion labs  - follow up post transfusion cbc  - maintain active T&S, adequate IV access  - trend cbc, transfuse for hgb >/= 7   - if active gib/HD instability, obtain CTA AP for localization  - please continue to hold home jardiance while inpatient   - will plan for egd/colonoscopy on Monday 10/23, please place on clears on Sunday and keep NPO p MN Sunday night  - GI team to order prep  - rest of care per primary team     dw pt/mother at bedside, ED, gi attg    YULISSA Hartley PA-C, RD, CDN  available on TEAMS for questions  after 5pm/weekends, please contact the on-call GI fellow at [phone]  38-year-old male with past medical history DM2, HLD sent to the ED with hemoglobin 7.6 (from 9.2 ~two months prior). GI consulted for rectal bleeding. In ED, tachy to 114, otherwise afebrile, normotensive, labs significant for microcytic anemia w/ mcv in 70s, initial hgb 7.3, no abd imaging.    # intermittent rectal bleeding x 3 months  # microcytic anemia  # tachycardia likely 2/2 above  # dm2 (last Jardiance use 10/19)  - c/o intermittent rectal bleeding for past 3 months w/ hgb below recent baseline and significantly below reported hgb ~1yr prior (~13), described as brown stools w/ brb streaking on stools and small to moderate amounts of brb seen in toilet, no large volume hematochezia, overt melena, or constipation, no prior egd/colon, fhx and meds n/c; ddx includes outlet bleeding, malignancy, colitis/ibd, diverticulosis, avms    Recommendations-  - admit to medicine  - please check iron studies on pre transfusion labs  - follow up post transfusion cbc  - maintain active T&S, adequate IV access  - trend cbc, transfuse for hgb >/= 7   - if active gib/HD instability, obtain CTA AP for localization  - please continue to hold home jardiance while inpatient   - will plan for egd/colonoscopy on Monday 10/23, please place on clears on Sunday and keep NPO p MN Sunday night  - GI team to order prep  - rest of care per primary team     dw pt/mother at bedside, ED, gi attg    YULISSA Hartley PA-C, RD, CDN  available on TEAMS for questions  after 5pm/weekends, please contact the on-call GI fellow at [phone]

## 2023-10-20 NOTE — ED PROVIDER NOTE - OBJECTIVE STATEMENT
38-year-old male with past medical history DM2, HLD sent to the ED with hemoglobin 7.6.  Patient reports over the past few months he has intermittent episodes of rectal bleeding.  Patient reports bright red blood in toilet but denies black stool.  Patient states he saw his primary care doctor and a GI doctor yesterday, has a colonoscopy scheduled for next week on 10/25, had blood work drawn yesterday but was called today that his hemoglobin was 7.6 and instructed to come to the ER.  Patient reports approximately 2 months ago his hemoglobin was 9.2.  Of note patient states he intermittently has increased fatigue otherwise feels normal. Reports negative stool occult yesterday, last bloody BM 3-4 days ago. Patient denies fever/chills, abdominal pain, chest pain, shortness of breath, dizziness, lightheadedness, weakness, recent travel or illness, blood thinner use or any other concerns.

## 2023-10-20 NOTE — H&P ADULT - PROBLEM SELECTOR PLAN 1
--Suspect 2/2 acute blood loss given intermittent rectal bleeding  --Obtain iron studies prior to transfusion  --1 unit PRBC  --Monitor CBCs, Transfuse Hgb < 7  --Maintain active type and screen  --Pending possible  egd/colonoscopy on Monday 10/23  --CC Diet today, clears 10/22 and NPO at midnight on Sunday  --If active gib/HD instability, obtain CTA AP

## 2023-10-20 NOTE — H&P ADULT - TIME BILLING
reviewing laboratory data, consultants' recommendations, documentation in [place], performing medically appropriate examinations/evaluations, discussion with patient/family/RN/ACP/Residents and interdisciplinary staff (such as , social workers, etc), counseling patient/family/care giver, ordering medically appropriate medication, tests, or procedures

## 2023-10-21 LAB
ANION GAP SERPL CALC-SCNC: 12 MMOL/L — SIGNIFICANT CHANGE UP (ref 7–14)
ANION GAP SERPL CALC-SCNC: 12 MMOL/L — SIGNIFICANT CHANGE UP (ref 7–14)
BLD GP AB SCN SERPL QL: NEGATIVE — SIGNIFICANT CHANGE UP
BLD GP AB SCN SERPL QL: NEGATIVE — SIGNIFICANT CHANGE UP
BUN SERPL-MCNC: 14 MG/DL — SIGNIFICANT CHANGE UP (ref 7–23)
BUN SERPL-MCNC: 14 MG/DL — SIGNIFICANT CHANGE UP (ref 7–23)
CALCIUM SERPL-MCNC: 8.6 MG/DL — SIGNIFICANT CHANGE UP (ref 8.4–10.5)
CALCIUM SERPL-MCNC: 8.6 MG/DL — SIGNIFICANT CHANGE UP (ref 8.4–10.5)
CHLORIDE SERPL-SCNC: 103 MMOL/L — SIGNIFICANT CHANGE UP (ref 98–107)
CHLORIDE SERPL-SCNC: 103 MMOL/L — SIGNIFICANT CHANGE UP (ref 98–107)
CO2 SERPL-SCNC: 21 MMOL/L — LOW (ref 22–31)
CO2 SERPL-SCNC: 21 MMOL/L — LOW (ref 22–31)
CREAT SERPL-MCNC: 0.56 MG/DL — SIGNIFICANT CHANGE UP (ref 0.5–1.3)
CREAT SERPL-MCNC: 0.56 MG/DL — SIGNIFICANT CHANGE UP (ref 0.5–1.3)
EGFR: 130 ML/MIN/1.73M2 — SIGNIFICANT CHANGE UP
EGFR: 130 ML/MIN/1.73M2 — SIGNIFICANT CHANGE UP
GLUCOSE BLDC GLUCOMTR-MCNC: 124 MG/DL — HIGH (ref 70–99)
GLUCOSE BLDC GLUCOMTR-MCNC: 124 MG/DL — HIGH (ref 70–99)
GLUCOSE BLDC GLUCOMTR-MCNC: 126 MG/DL — HIGH (ref 70–99)
GLUCOSE BLDC GLUCOMTR-MCNC: 126 MG/DL — HIGH (ref 70–99)
GLUCOSE BLDC GLUCOMTR-MCNC: 133 MG/DL — HIGH (ref 70–99)
GLUCOSE BLDC GLUCOMTR-MCNC: 133 MG/DL — HIGH (ref 70–99)
GLUCOSE BLDC GLUCOMTR-MCNC: 139 MG/DL — HIGH (ref 70–99)
GLUCOSE BLDC GLUCOMTR-MCNC: 139 MG/DL — HIGH (ref 70–99)
GLUCOSE SERPL-MCNC: 121 MG/DL — HIGH (ref 70–99)
GLUCOSE SERPL-MCNC: 121 MG/DL — HIGH (ref 70–99)
HCT VFR BLD CALC: 28.3 % — LOW (ref 39–50)
HCT VFR BLD CALC: 28.3 % — LOW (ref 39–50)
HGB BLD-MCNC: 8.3 G/DL — LOW (ref 13–17)
HGB BLD-MCNC: 8.3 G/DL — LOW (ref 13–17)
MAGNESIUM SERPL-MCNC: 2.1 MG/DL — SIGNIFICANT CHANGE UP (ref 1.6–2.6)
MAGNESIUM SERPL-MCNC: 2.1 MG/DL — SIGNIFICANT CHANGE UP (ref 1.6–2.6)
MCHC RBC-ENTMCNC: 21.7 PG — LOW (ref 27–34)
MCHC RBC-ENTMCNC: 21.7 PG — LOW (ref 27–34)
MCHC RBC-ENTMCNC: 29.3 GM/DL — LOW (ref 32–36)
MCHC RBC-ENTMCNC: 29.3 GM/DL — LOW (ref 32–36)
MCV RBC AUTO: 73.9 FL — LOW (ref 80–100)
MCV RBC AUTO: 73.9 FL — LOW (ref 80–100)
NRBC # BLD: 0 /100 WBCS — SIGNIFICANT CHANGE UP (ref 0–0)
NRBC # BLD: 0 /100 WBCS — SIGNIFICANT CHANGE UP (ref 0–0)
NRBC # FLD: 0 K/UL — SIGNIFICANT CHANGE UP (ref 0–0)
NRBC # FLD: 0 K/UL — SIGNIFICANT CHANGE UP (ref 0–0)
PHOSPHATE SERPL-MCNC: 2.8 MG/DL — SIGNIFICANT CHANGE UP (ref 2.5–4.5)
PHOSPHATE SERPL-MCNC: 2.8 MG/DL — SIGNIFICANT CHANGE UP (ref 2.5–4.5)
PLATELET # BLD AUTO: 217 K/UL — SIGNIFICANT CHANGE UP (ref 150–400)
PLATELET # BLD AUTO: 217 K/UL — SIGNIFICANT CHANGE UP (ref 150–400)
POTASSIUM SERPL-MCNC: 3.9 MMOL/L — SIGNIFICANT CHANGE UP (ref 3.5–5.3)
POTASSIUM SERPL-MCNC: 3.9 MMOL/L — SIGNIFICANT CHANGE UP (ref 3.5–5.3)
POTASSIUM SERPL-SCNC: 3.9 MMOL/L — SIGNIFICANT CHANGE UP (ref 3.5–5.3)
POTASSIUM SERPL-SCNC: 3.9 MMOL/L — SIGNIFICANT CHANGE UP (ref 3.5–5.3)
RBC # BLD: 3.83 M/UL — LOW (ref 4.2–5.8)
RBC # BLD: 3.83 M/UL — LOW (ref 4.2–5.8)
RBC # FLD: 17.7 % — HIGH (ref 10.3–14.5)
RBC # FLD: 17.7 % — HIGH (ref 10.3–14.5)
RH IG SCN BLD-IMP: POSITIVE — SIGNIFICANT CHANGE UP
RH IG SCN BLD-IMP: POSITIVE — SIGNIFICANT CHANGE UP
SODIUM SERPL-SCNC: 136 MMOL/L — SIGNIFICANT CHANGE UP (ref 135–145)
SODIUM SERPL-SCNC: 136 MMOL/L — SIGNIFICANT CHANGE UP (ref 135–145)
WBC # BLD: 8.55 K/UL — SIGNIFICANT CHANGE UP (ref 3.8–10.5)
WBC # BLD: 8.55 K/UL — SIGNIFICANT CHANGE UP (ref 3.8–10.5)
WBC # FLD AUTO: 8.55 K/UL — SIGNIFICANT CHANGE UP (ref 3.8–10.5)
WBC # FLD AUTO: 8.55 K/UL — SIGNIFICANT CHANGE UP (ref 3.8–10.5)

## 2023-10-21 PROCEDURE — 99232 SBSQ HOSP IP/OBS MODERATE 35: CPT

## 2023-10-21 RX ORDER — IRON SUCROSE 20 MG/ML
200 INJECTION, SOLUTION INTRAVENOUS EVERY 24 HOURS
Refills: 0 | Status: DISCONTINUED | OUTPATIENT
Start: 2023-10-21 | End: 2023-10-21

## 2023-10-21 RX ORDER — INFLUENZA VIRUS VACCINE 15; 15; 15; 15 UG/.5ML; UG/.5ML; UG/.5ML; UG/.5ML
0.5 SUSPENSION INTRAMUSCULAR ONCE
Refills: 0 | Status: COMPLETED | OUTPATIENT
Start: 2023-10-21 | End: 2023-10-21

## 2023-10-21 RX ORDER — IRON SUCROSE 20 MG/ML
200 INJECTION, SOLUTION INTRAVENOUS EVERY 24 HOURS
Refills: 0 | Status: DISCONTINUED | OUTPATIENT
Start: 2023-10-21 | End: 2023-10-23

## 2023-10-21 RX ADMIN — IRON SUCROSE 110 MILLIGRAM(S): 20 INJECTION, SOLUTION INTRAVENOUS at 16:00

## 2023-10-21 RX ADMIN — ATORVASTATIN CALCIUM 40 MILLIGRAM(S): 80 TABLET, FILM COATED ORAL at 22:53

## 2023-10-21 RX ADMIN — Medication 1 APPLICATION(S): at 17:45

## 2023-10-21 RX ADMIN — Medication 1 APPLICATION(S): at 05:22

## 2023-10-21 NOTE — PATIENT PROFILE ADULT - FALL HARM RISK - HARM RISK INTERVENTIONS

## 2023-10-22 LAB
A1C WITH ESTIMATED AVERAGE GLUCOSE RESULT: 6.6 % — HIGH (ref 4–5.6)
A1C WITH ESTIMATED AVERAGE GLUCOSE RESULT: 6.6 % — HIGH (ref 4–5.6)
ALBUMIN SERPL ELPH-MCNC: 4.7 G/DL — SIGNIFICANT CHANGE UP (ref 3.3–5)
ALBUMIN SERPL ELPH-MCNC: 4.7 G/DL — SIGNIFICANT CHANGE UP (ref 3.3–5)
ALP SERPL-CCNC: 93 U/L — SIGNIFICANT CHANGE UP (ref 40–120)
ALP SERPL-CCNC: 93 U/L — SIGNIFICANT CHANGE UP (ref 40–120)
ALT FLD-CCNC: 20 U/L — SIGNIFICANT CHANGE UP (ref 4–41)
ALT FLD-CCNC: 20 U/L — SIGNIFICANT CHANGE UP (ref 4–41)
ANION GAP SERPL CALC-SCNC: 14 MMOL/L — SIGNIFICANT CHANGE UP (ref 7–14)
ANION GAP SERPL CALC-SCNC: 14 MMOL/L — SIGNIFICANT CHANGE UP (ref 7–14)
AST SERPL-CCNC: 24 U/L — SIGNIFICANT CHANGE UP (ref 4–40)
AST SERPL-CCNC: 24 U/L — SIGNIFICANT CHANGE UP (ref 4–40)
BILIRUB SERPL-MCNC: 0.4 MG/DL — SIGNIFICANT CHANGE UP (ref 0.2–1.2)
BILIRUB SERPL-MCNC: 0.4 MG/DL — SIGNIFICANT CHANGE UP (ref 0.2–1.2)
BUN SERPL-MCNC: 19 MG/DL — SIGNIFICANT CHANGE UP (ref 7–23)
BUN SERPL-MCNC: 19 MG/DL — SIGNIFICANT CHANGE UP (ref 7–23)
CALCIUM SERPL-MCNC: 9.5 MG/DL — SIGNIFICANT CHANGE UP (ref 8.4–10.5)
CALCIUM SERPL-MCNC: 9.5 MG/DL — SIGNIFICANT CHANGE UP (ref 8.4–10.5)
CHLORIDE SERPL-SCNC: 101 MMOL/L — SIGNIFICANT CHANGE UP (ref 98–107)
CHLORIDE SERPL-SCNC: 101 MMOL/L — SIGNIFICANT CHANGE UP (ref 98–107)
CO2 SERPL-SCNC: 21 MMOL/L — LOW (ref 22–31)
CO2 SERPL-SCNC: 21 MMOL/L — LOW (ref 22–31)
CREAT SERPL-MCNC: 0.58 MG/DL — SIGNIFICANT CHANGE UP (ref 0.5–1.3)
CREAT SERPL-MCNC: 0.58 MG/DL — SIGNIFICANT CHANGE UP (ref 0.5–1.3)
EGFR: 129 ML/MIN/1.73M2 — SIGNIFICANT CHANGE UP
EGFR: 129 ML/MIN/1.73M2 — SIGNIFICANT CHANGE UP
ESTIMATED AVERAGE GLUCOSE: 143 — SIGNIFICANT CHANGE UP
ESTIMATED AVERAGE GLUCOSE: 143 — SIGNIFICANT CHANGE UP
GLUCOSE BLDC GLUCOMTR-MCNC: 118 MG/DL — HIGH (ref 70–99)
GLUCOSE BLDC GLUCOMTR-MCNC: 118 MG/DL — HIGH (ref 70–99)
GLUCOSE BLDC GLUCOMTR-MCNC: 124 MG/DL — HIGH (ref 70–99)
GLUCOSE BLDC GLUCOMTR-MCNC: 124 MG/DL — HIGH (ref 70–99)
GLUCOSE BLDC GLUCOMTR-MCNC: 141 MG/DL — HIGH (ref 70–99)
GLUCOSE BLDC GLUCOMTR-MCNC: 141 MG/DL — HIGH (ref 70–99)
GLUCOSE BLDC GLUCOMTR-MCNC: 209 MG/DL — HIGH (ref 70–99)
GLUCOSE BLDC GLUCOMTR-MCNC: 209 MG/DL — HIGH (ref 70–99)
GLUCOSE SERPL-MCNC: 116 MG/DL — HIGH (ref 70–99)
GLUCOSE SERPL-MCNC: 116 MG/DL — HIGH (ref 70–99)
HCT VFR BLD CALC: 32 % — LOW (ref 39–50)
HCT VFR BLD CALC: 32 % — LOW (ref 39–50)
HGB BLD-MCNC: 9.6 G/DL — LOW (ref 13–17)
HGB BLD-MCNC: 9.6 G/DL — LOW (ref 13–17)
MCHC RBC-ENTMCNC: 21.9 PG — LOW (ref 27–34)
MCHC RBC-ENTMCNC: 21.9 PG — LOW (ref 27–34)
MCHC RBC-ENTMCNC: 30 GM/DL — LOW (ref 32–36)
MCHC RBC-ENTMCNC: 30 GM/DL — LOW (ref 32–36)
MCV RBC AUTO: 72.9 FL — LOW (ref 80–100)
MCV RBC AUTO: 72.9 FL — LOW (ref 80–100)
NRBC # BLD: 0 /100 WBCS — SIGNIFICANT CHANGE UP (ref 0–0)
NRBC # BLD: 0 /100 WBCS — SIGNIFICANT CHANGE UP (ref 0–0)
NRBC # FLD: 0 K/UL — SIGNIFICANT CHANGE UP (ref 0–0)
NRBC # FLD: 0 K/UL — SIGNIFICANT CHANGE UP (ref 0–0)
PLATELET # BLD AUTO: 229 K/UL — SIGNIFICANT CHANGE UP (ref 150–400)
PLATELET # BLD AUTO: 229 K/UL — SIGNIFICANT CHANGE UP (ref 150–400)
POTASSIUM SERPL-MCNC: 4.2 MMOL/L — SIGNIFICANT CHANGE UP (ref 3.5–5.3)
POTASSIUM SERPL-MCNC: 4.2 MMOL/L — SIGNIFICANT CHANGE UP (ref 3.5–5.3)
POTASSIUM SERPL-SCNC: 4.2 MMOL/L — SIGNIFICANT CHANGE UP (ref 3.5–5.3)
POTASSIUM SERPL-SCNC: 4.2 MMOL/L — SIGNIFICANT CHANGE UP (ref 3.5–5.3)
PROT SERPL-MCNC: 7.9 G/DL — SIGNIFICANT CHANGE UP (ref 6–8.3)
PROT SERPL-MCNC: 7.9 G/DL — SIGNIFICANT CHANGE UP (ref 6–8.3)
RBC # BLD: 4.39 M/UL — SIGNIFICANT CHANGE UP (ref 4.2–5.8)
RBC # BLD: 4.39 M/UL — SIGNIFICANT CHANGE UP (ref 4.2–5.8)
RBC # FLD: 17.7 % — HIGH (ref 10.3–14.5)
RBC # FLD: 17.7 % — HIGH (ref 10.3–14.5)
SODIUM SERPL-SCNC: 136 MMOL/L — SIGNIFICANT CHANGE UP (ref 135–145)
SODIUM SERPL-SCNC: 136 MMOL/L — SIGNIFICANT CHANGE UP (ref 135–145)
WBC # BLD: 7.88 K/UL — SIGNIFICANT CHANGE UP (ref 3.8–10.5)
WBC # BLD: 7.88 K/UL — SIGNIFICANT CHANGE UP (ref 3.8–10.5)
WBC # FLD AUTO: 7.88 K/UL — SIGNIFICANT CHANGE UP (ref 3.8–10.5)
WBC # FLD AUTO: 7.88 K/UL — SIGNIFICANT CHANGE UP (ref 3.8–10.5)

## 2023-10-22 PROCEDURE — 99232 SBSQ HOSP IP/OBS MODERATE 35: CPT

## 2023-10-22 RX ORDER — SOD SULF/SODIUM/NAHCO3/KCL/PEG
4000 SOLUTION, RECONSTITUTED, ORAL ORAL ONCE
Refills: 0 | Status: COMPLETED | OUTPATIENT
Start: 2023-10-22 | End: 2023-10-22

## 2023-10-22 RX ADMIN — Medication 4000 MILLILITER(S): at 16:41

## 2023-10-22 RX ADMIN — IRON SUCROSE 110 MILLIGRAM(S): 20 INJECTION, SOLUTION INTRAVENOUS at 16:10

## 2023-10-22 RX ADMIN — Medication 1 APPLICATION(S): at 05:11

## 2023-10-22 RX ADMIN — Medication 1 APPLICATION(S): at 17:26

## 2023-10-22 RX ADMIN — ATORVASTATIN CALCIUM 40 MILLIGRAM(S): 80 TABLET, FILM COATED ORAL at 21:50

## 2023-10-22 NOTE — DISCHARGE NOTE PROVIDER - CARE PROVIDER_API CALL
Tod Stallings Edis  Internal Medicine  [address] [place] [id number]  Phone: [phone]  Fax: [phone]  Established Patient  Follow Up Time: 2 weeks    Carissa Hyman  Gastroenterology  [address] [place] [address]  Phone: [phone]  Fax: [phone]  Follow Up Time: 1 month

## 2023-10-22 NOTE — PROGRESS NOTE ADULT - PROBLEM SELECTOR PLAN 2
--Noted in L eye. Improving per pt  --Resume warm compress  --Topical erythromycin x 7 days  --Continue to monitor
--Noted in L eye. Improving per pt  --Resume warm compress  --Topical erythromycin x 7 days  --Continue to monitor

## 2023-10-22 NOTE — PROGRESS NOTE ADULT - ASSESSMENT
38-year-old male with PMH of DM2, HLD sent to the ED for hemoglobin 7.6. i/s/o BRBPR
38-year-old male with PMH of DM2, HLD sent to the ED for hemoglobin 7.6. i/s/o BRBPR

## 2023-10-22 NOTE — DISCHARGE NOTE PROVIDER - HOSPITAL COURSE
38M with PMH of DM2, HLD sent to the ED his PCP for hemoglobin 7.6 and BRBPR intermittently for 5 months. In the ED patient was given 1U PRBC and admitted to medicine. Iron studies showed iron deficiency anemia and was treated with IV iron and will continue with PO iron on discharge His hemoglobin was stable and did not have any further episodes of bleeding. GI did EGD/colonoscopy that showed ____. 38M with PMH of DM2, HLD sent to the ED his PCP for hemoglobin 7.6 and BRBPR intermittently for 5 months. In the ED patient was given 1U PRBC and admitted to medicine. Iron studies showed iron deficiency anemia and was treated with IV iron and will continue with PO iron on discharge His hemoglobin was stable and did not have any further episodes of bleeding. GI did EGD/colonoscopy that showed internal hemorrhoids (nonbleeding) and multiple duodenal ulcers. At this point - stable for DC. To be started on PPI and iron supplementation. OP f/u with PCP and GI for further management and path results.    #Acute GI blood loss anemia  #Iron deficiency anemia  #Duodenal ulcer  #Internal hemorrhoid  #DM2  #HLD

## 2023-10-22 NOTE — DISCHARGE NOTE PROVIDER - NSDCCPCAREPLAN_GEN_ALL_CORE_FT
PRINCIPAL DISCHARGE DIAGNOSIS  Diagnosis: GI bleeding  Assessment and Plan of Treatment: You were found to have low hemoglobin counts and low iron secondary to ongoing GI bleeding. You were given 1 unti of blood and started on IV iron. You were seen by GI and had colonoscopy that showed_____     PRINCIPAL DISCHARGE DIAGNOSIS  Diagnosis: GI bleeding  Assessment and Plan of Treatment: You were found to have low hemoglobin counts and low iron secondary to ongoing GI bleeding. You were given 1 unti of blood and started on IV iron. You were seen by GI and had colonoscopy that showed you had duodenal ulcers and also internal hemorrhoids - likely sources of your  bleed. Pathology was sent for analysis. You will need to follow up with GI for the results. Their contact has been provided. If you have any recurrence of bleeding - please notify them or return to hospital for further evaluation.      SECONDARY DISCHARGE DIAGNOSES  Diagnosis: Anemia  Assessment and Plan of Treatment: You were found to be iron deficient, likely due to GI bleeding.  You have been started on iron supplementation. Note that it can cause stool to be dark/black. It can also worsen constipation. Increase fiber intake/hydration.

## 2023-10-22 NOTE — PROGRESS NOTE ADULT - PROBLEM SELECTOR PLAN 5
DVT: SCDs given GIB  DIET: CC Diet  DISPO: Pending hospital course. Likely home
DVT: SCDs given GIB  DIET: CC Diet  DISPO: Pending hospital course. Likely home

## 2023-10-22 NOTE — DISCHARGE NOTE PROVIDER - NSDCFUADDAPPT_GEN_ALL_CORE_FT
Follow up with your primary care physician for further monitoring in 2 weeks. Please call to arrange appointment.     Follow up with GI Dr. Helton at [address] [place] [address] in [place] for pathology results. Please call [phone] to arrange an appointment.

## 2023-10-22 NOTE — PROGRESS NOTE ADULT - SUBJECTIVE AND OBJECTIVE BOX
Patient is a 38y old  Male who presents with a chief complaint of BRBPR/Anemia (21 Oct 2023 15:41)    Cleveland Alicia MD   [facility] Division of Hospital Medicine   Pager [phone]  Reachable on Microsoft Teams     SUBJECTIVE / OVERNIGHT EVENTS:  Patient seen and examined this morning. No events overnight. Denies any bleeding .    MEDICATIONS  (STANDING):  atorvastatin 40 milliGRAM(s) Oral at bedtime  dextrose 50% Injectable 25 Gram(s) IV Push once  dextrose 50% Injectable 12.5 Gram(s) IV Push once  dextrose 50% Injectable 25 Gram(s) IV Push once  dextrose Oral Gel 15 Gram(s) Oral once  erythromycin   Ointment 1 Application(s) Left EYE two times a day  glucagon  Injectable 1 milliGRAM(s) IntraMuscular once  influenza   Vaccine 0.5 milliLiter(s) IntraMuscular once  insulin lispro (ADMELOG) corrective regimen sliding scale   SubCutaneous three times a day before meals  insulin lispro (ADMELOG) corrective regimen sliding scale   SubCutaneous at bedtime  iron sucrose IVPB 200 milliGRAM(s) IV Intermittent every 24 hours  polyethylene glycol/electrolyte Solution. 4000 milliLiter(s) Oral once    MEDICATIONS  (PRN):      Vital Signs Last 24 Hrs  T(C): 36.7 (22 Oct 2023 13:34), Max: 36.8 (21 Oct 2023 20:56)  T(F): 98 (22 Oct 2023 13:34), Max: 98.3 (21 Oct 2023 20:56)  HR: 89 (22 Oct 2023 13:34) (73 - 89)  BP: 123/86 (22 Oct 2023 13:34) (109/62 - 123/86)  BP(mean): --  RR: 18 (22 Oct 2023 13:34) (18 - 18)  SpO2: 100% (22 Oct 2023 13:34) (100% - 100%)  CAPILLARY BLOOD GLUCOSE      POCT Blood Glucose.: 124 mg/dL (22 Oct 2023 12:22)  POCT Blood Glucose.: 141 mg/dL (22 Oct 2023 09:05)  POCT Blood Glucose.: 139 mg/dL (21 Oct 2023 22:04)  POCT Blood Glucose.: 133 mg/dL (21 Oct 2023 17:24)    I&O's Summary    General: man sitting up in bed appears comfortable in NAD, awake and alert  HENMT: MMM  Respiratory: No respiratory distress, CTABL, No rales, rhonchi, wheezing.  Cardiovascular: S1,S2; Regular rate and rhythm; No m/g/r. 2  Gastrointestinal: Soft, Nontender, Nondistended; +BS.   Extremities: No c/c/e; warm to touch  Skin: No rashes, No erythema   Psych: appropriate mood and affect      LABS:                        9.6    7.88  )-----------( 229      ( 22 Oct 2023 04:58 )             32.0     10-22    136  |  101  |  19  ----------------------------<  116<H>  4.2   |  21<L>  |  0.58    Ca    9.5      22 Oct 2023 04:58  Phos  2.8     10-21  Mg     2.10     10-21    TPro  7.9  /  Alb  4.7  /  TBili  0.4  /  DBili  x   /  AST  24  /  ALT  20  /  AlkPhos  93  10-22          Urinalysis Basic - ( 22 Oct 2023 04:58 )    Color: x / Appearance: x / SG: x / pH: x  Gluc: 116 mg/dL / Ketone: x  / Bili: x / Urobili: x   Blood: x / Protein: x / Nitrite: x   Leuk Esterase: x / RBC: x / WBC x   Sq Epi: x / Non Sq Epi: x / Bacteria: x        RADIOLOGY & ADDITIONAL TESTS:    Imaging Personally Reviewed:    Consultant(s) Notes Reviewed:      Care Discussed with Consultants/Other Providers: GI  
Patient is a 38y old  Male who presents with a chief complaint of BRBPR/Anemia (20 Oct 2023 18:24)    Cleveland Alicia MD   [facility] Medicine   Pager [phone]  Reachable on Microsoft Teams     SUBJECTIVE / OVERNIGHT EVENTS:  Patient seen and examined this morning. No events overnight.  No episodes of bleeding     MEDICATIONS  (STANDING):  atorvastatin 40 milliGRAM(s) Oral at bedtime  dextrose 50% Injectable 25 Gram(s) IV Push once  dextrose 50% Injectable 12.5 Gram(s) IV Push once  dextrose 50% Injectable 25 Gram(s) IV Push once  dextrose Oral Gel 15 Gram(s) Oral once  erythromycin   Ointment 1 Application(s) Left EYE two times a day  glucagon  Injectable 1 milliGRAM(s) IntraMuscular once  influenza   Vaccine 0.5 milliLiter(s) IntraMuscular once  insulin lispro (ADMELOG) corrective regimen sliding scale   SubCutaneous three times a day before meals  insulin lispro (ADMELOG) corrective regimen sliding scale   SubCutaneous at bedtime  iron sucrose IVPB 200 milliGRAM(s) IV Intermittent every 24 hours    MEDICATIONS  (PRN):      Vital Signs Last 24 Hrs  T(C): 36.7 (21 Oct 2023 13:05), Max: 37.2 (20 Oct 2023 21:30)  T(F): 98.1 (21 Oct 2023 13:05), Max: 98.9 (20 Oct 2023 21:30)  HR: 90 (21 Oct 2023 13:05) (80 - 92)  BP: 126/74 (21 Oct 2023 13:05) (111/70 - 137/77)  BP(mean): --  RR: 18 (21 Oct 2023 13:05) (18 - 18)  SpO2: 99% (21 Oct 2023 13:05) (99% - 100%)  CAPILLARY BLOOD GLUCOSE      POCT Blood Glucose.: 124 mg/dL (21 Oct 2023 12:30)  POCT Blood Glucose.: 126 mg/dL (21 Oct 2023 08:44)  POCT Blood Glucose.: 141 mg/dL (20 Oct 2023 22:40)  POCT Blood Glucose.: 130 mg/dL (20 Oct 2023 17:34)    I&O's Summary      General: man sitting up in bed appears comfortable in NAD, awake and alert  HENMT: MMM  Respiratory: No respiratory distress, CTABL, No rales, rhonchi, wheezing.  Cardiovascular: S1,S2; Regular rate and rhythm; No m/g/r. 2+ peripheral pulses  Gastrointestinal: Soft, Nontender, Nondistended; +BS.   Extremities: No c/c/e; warm to touch  Neurological: Moving all 4 extremities; Sensation to LT grossly in tact.  Skin: No rashes, No erythema   Psych: appropriate mood and affect    LABS:                        8.3    8.55  )-----------( 217      ( 21 Oct 2023 03:35 )             28.3     10-21    136  |  103  |  14  ----------------------------<  121<H>  3.9   |  21<L>  |  0.56    Ca    8.6      21 Oct 2023 03:35  Phos  2.8     10-21  Mg     2.10     10-21    TPro  7.9  /  Alb  4.8  /  TBili  0.3  /  DBili  x   /  AST  20  /  ALT  16  /  AlkPhos  107  10-20    PT/INR - ( 20 Oct 2023 12:30 )   PT: 11.9 sec;   INR: 1.06 ratio         PTT - ( 20 Oct 2023 12:30 )  PTT:27.2 sec      Urinalysis Basic - ( 21 Oct 2023 03:35 )    Color: x / Appearance: x / SG: x / pH: x  Gluc: 121 mg/dL / Ketone: x  / Bili: x / Urobili: x   Blood: x / Protein: x / Nitrite: x   Leuk Esterase: x / RBC: x / WBC x   Sq Epi: x / Non Sq Epi: x / Bacteria: x        RADIOLOGY & ADDITIONAL TESTS:    Imaging Personally Reviewed:    Consultant(s) Notes Reviewed:      Care Discussed with Consultants/Other Providers:

## 2023-10-22 NOTE — PROGRESS NOTE ADULT - PROBLEM SELECTOR PLAN 3
--Hold PO meds  --Obtain A1C  --Resume ISS  --CC diet
--Hold PO meds  --Obtain A1C  --Resume ISS  --CC diet

## 2023-10-22 NOTE — PROGRESS NOTE ADULT - PROBLEM SELECTOR PLAN 1
# Acute blood loss anemia   # Iron Deficiency anemia   likely 2/2 acute blood loss given intermittent rectal bleeding  - Ferritin 9, consistent with KIN   - s/p 1 unit PRBC  - venofer 200mg x 5 doses  - Monitor CBCs, Transfuse Hgb < 7  - Maintain active type and screen  - Pending egd/colonoscopy on Monday 10/23
# Acute blood loss anemia   # Iron Deficiency anemia   - likely 2/2 acute blood loss given intermittent rectal bleeding  - Ferritin 9, consistent with KIN   - s/p 1 unit PRBC  - venofer 200mg x 5 doses  - Monitor CBCs, Transfuse Hgb < 7  - Maintain active type and screen  - Pending egd/colonoscopy on Monday 10/23

## 2023-10-22 NOTE — DISCHARGE NOTE PROVIDER - NSDCCPTREATMENT_GEN_ALL_CORE_FT
PRINCIPAL PROCEDURE  Procedure: EGD  Findings and Treatment:   Impression:          - Normal esophagus.                       - Z-line regular.                       - Hematin (altered blood/coffee-ground-like material) in                     the gastric body and in the gastric antrum.                       - Multiple superficial non-bleeding duodenal ulcers with                        no stigmata of bleeding at the duodenal bulb, likely                        cause of the patient's anemia.                       - Normal second portion of the duodenum.                       - Gastric biopsies were taken with a cold forceps for                        Helicobacter pylori testing.  Recommendation:      - Return patient to hospital nance for ongoing care.                       - Await pathology results. Treat if H pylori positive.                       - Resume regular diet today.                       - Patient should continue pantoprazole 40 mg daily for 8                weeks.                       - Patient should be discharged on iron supplementation.                        Can be discharged today from GI perspective.                       - Follow up with GI in one month. Pt can follow with his                    own GI or with me (gave him my card)                                                                                 < from: Upper Endoscopy (10.23.23 @ 09:09) >        SECONDARY PROCEDURE  Procedure: Colonoscopy  Findings and Treatment:   < end of copied text >  Impression:          - Hemorrhoids found on perianal exam.                       - Non-bleeding internal hemorrhoids. Likely cause of the                        patient's hematochezia.                       - Normal mucosa in the entire examined colon.                       - The examined portion of the ileum was normal.       - No specimens collected.  Recommendation:      - Return patient to hospital nance for ongoing care.                       - Resume regular diet today.                       - Patient should be discharged on iron supplementation.       - Please refer to separate EGD report.< from: Colonoscopy (10.23.23 @ 09:10) >

## 2023-10-22 NOTE — DISCHARGE NOTE PROVIDER - ATTENDING DISCHARGE PHYSICAL EXAMINATION:
Pt seen and evaluated at bedside this AM. No o/n events. Denies any SOB/Cp/NV. Just returned from endo. No abd pain. No NV.    Gen- In bed, comfortable, NAD  Resp- CTAB, good effort.  CVS- RRR, S1S2, no g/r/m.  GI- Soft abd, NT, ND, +BSx4  Ext- NO C/C  Neuro- CN II-XII intact. Speech fluent/face symmetric.

## 2023-10-22 NOTE — DISCHARGE NOTE PROVIDER - PROVIDER TOKENS
PROVIDER:[TOKEN:[[id number]],FOLLOWUP:[2 weeks],ESTABLISHEDPATIENT:[T]],PROVIDER:[TOKEN:[[id number]],FOLLOWUP:[1 month]]

## 2023-10-23 VITALS
RESPIRATION RATE: 18 BRPM | SYSTOLIC BLOOD PRESSURE: 127 MMHG | OXYGEN SATURATION: 100 % | HEART RATE: 91 BPM | DIASTOLIC BLOOD PRESSURE: 84 MMHG | TEMPERATURE: 98 F

## 2023-10-23 LAB
ALBUMIN SERPL ELPH-MCNC: 4.7 G/DL — SIGNIFICANT CHANGE UP (ref 3.3–5)
ALBUMIN SERPL ELPH-MCNC: 4.7 G/DL — SIGNIFICANT CHANGE UP (ref 3.3–5)
ALP SERPL-CCNC: 96 U/L — SIGNIFICANT CHANGE UP (ref 40–120)
ALP SERPL-CCNC: 96 U/L — SIGNIFICANT CHANGE UP (ref 40–120)
ALT FLD-CCNC: 28 U/L — SIGNIFICANT CHANGE UP (ref 4–41)
ALT FLD-CCNC: 28 U/L — SIGNIFICANT CHANGE UP (ref 4–41)
ANION GAP SERPL CALC-SCNC: 14 MMOL/L — SIGNIFICANT CHANGE UP (ref 7–14)
ANION GAP SERPL CALC-SCNC: 14 MMOL/L — SIGNIFICANT CHANGE UP (ref 7–14)
APTT BLD: 32.9 SEC — SIGNIFICANT CHANGE UP (ref 24.5–35.6)
APTT BLD: 32.9 SEC — SIGNIFICANT CHANGE UP (ref 24.5–35.6)
AST SERPL-CCNC: 25 U/L — SIGNIFICANT CHANGE UP (ref 4–40)
AST SERPL-CCNC: 25 U/L — SIGNIFICANT CHANGE UP (ref 4–40)
BILIRUB SERPL-MCNC: 0.5 MG/DL — SIGNIFICANT CHANGE UP (ref 0.2–1.2)
BILIRUB SERPL-MCNC: 0.5 MG/DL — SIGNIFICANT CHANGE UP (ref 0.2–1.2)
BLD GP AB SCN SERPL QL: NEGATIVE — SIGNIFICANT CHANGE UP
BLD GP AB SCN SERPL QL: NEGATIVE — SIGNIFICANT CHANGE UP
BUN SERPL-MCNC: 13 MG/DL — SIGNIFICANT CHANGE UP (ref 7–23)
BUN SERPL-MCNC: 13 MG/DL — SIGNIFICANT CHANGE UP (ref 7–23)
CALCIUM SERPL-MCNC: 9.1 MG/DL — SIGNIFICANT CHANGE UP (ref 8.4–10.5)
CALCIUM SERPL-MCNC: 9.1 MG/DL — SIGNIFICANT CHANGE UP (ref 8.4–10.5)
CHLORIDE SERPL-SCNC: 101 MMOL/L — SIGNIFICANT CHANGE UP (ref 98–107)
CHLORIDE SERPL-SCNC: 101 MMOL/L — SIGNIFICANT CHANGE UP (ref 98–107)
CO2 SERPL-SCNC: 24 MMOL/L — SIGNIFICANT CHANGE UP (ref 22–31)
CO2 SERPL-SCNC: 24 MMOL/L — SIGNIFICANT CHANGE UP (ref 22–31)
CREAT SERPL-MCNC: 0.61 MG/DL — SIGNIFICANT CHANGE UP (ref 0.5–1.3)
CREAT SERPL-MCNC: 0.61 MG/DL — SIGNIFICANT CHANGE UP (ref 0.5–1.3)
EGFR: 127 ML/MIN/1.73M2 — SIGNIFICANT CHANGE UP
EGFR: 127 ML/MIN/1.73M2 — SIGNIFICANT CHANGE UP
GLUCOSE BLDC GLUCOMTR-MCNC: 124 MG/DL — HIGH (ref 70–99)
GLUCOSE BLDC GLUCOMTR-MCNC: 124 MG/DL — HIGH (ref 70–99)
GLUCOSE BLDC GLUCOMTR-MCNC: 133 MG/DL — HIGH (ref 70–99)
GLUCOSE BLDC GLUCOMTR-MCNC: 133 MG/DL — HIGH (ref 70–99)
GLUCOSE BLDC GLUCOMTR-MCNC: 140 MG/DL — HIGH (ref 70–99)
GLUCOSE BLDC GLUCOMTR-MCNC: 140 MG/DL — HIGH (ref 70–99)
GLUCOSE BLDC GLUCOMTR-MCNC: 142 MG/DL — HIGH (ref 70–99)
GLUCOSE BLDC GLUCOMTR-MCNC: 142 MG/DL — HIGH (ref 70–99)
GLUCOSE SERPL-MCNC: 120 MG/DL — HIGH (ref 70–99)
GLUCOSE SERPL-MCNC: 120 MG/DL — HIGH (ref 70–99)
HCT VFR BLD CALC: 31.7 % — LOW (ref 39–50)
HCT VFR BLD CALC: 31.7 % — LOW (ref 39–50)
HGB BLD-MCNC: 9.6 G/DL — LOW (ref 13–17)
HGB BLD-MCNC: 9.6 G/DL — LOW (ref 13–17)
INR BLD: 1.24 RATIO — HIGH (ref 0.85–1.18)
INR BLD: 1.24 RATIO — HIGH (ref 0.85–1.18)
MAGNESIUM SERPL-MCNC: 2.3 MG/DL — SIGNIFICANT CHANGE UP (ref 1.6–2.6)
MAGNESIUM SERPL-MCNC: 2.3 MG/DL — SIGNIFICANT CHANGE UP (ref 1.6–2.6)
MCHC RBC-ENTMCNC: 21.9 PG — LOW (ref 27–34)
MCHC RBC-ENTMCNC: 21.9 PG — LOW (ref 27–34)
MCHC RBC-ENTMCNC: 30.3 GM/DL — LOW (ref 32–36)
MCHC RBC-ENTMCNC: 30.3 GM/DL — LOW (ref 32–36)
MCV RBC AUTO: 72.4 FL — LOW (ref 80–100)
MCV RBC AUTO: 72.4 FL — LOW (ref 80–100)
NRBC # BLD: 0 /100 WBCS — SIGNIFICANT CHANGE UP (ref 0–0)
NRBC # BLD: 0 /100 WBCS — SIGNIFICANT CHANGE UP (ref 0–0)
NRBC # FLD: 0 K/UL — SIGNIFICANT CHANGE UP (ref 0–0)
NRBC # FLD: 0 K/UL — SIGNIFICANT CHANGE UP (ref 0–0)
PHOSPHATE SERPL-MCNC: 3.4 MG/DL — SIGNIFICANT CHANGE UP (ref 2.5–4.5)
PHOSPHATE SERPL-MCNC: 3.4 MG/DL — SIGNIFICANT CHANGE UP (ref 2.5–4.5)
PLATELET # BLD AUTO: 236 K/UL — SIGNIFICANT CHANGE UP (ref 150–400)
PLATELET # BLD AUTO: 236 K/UL — SIGNIFICANT CHANGE UP (ref 150–400)
POTASSIUM SERPL-MCNC: 3.4 MMOL/L — LOW (ref 3.5–5.3)
POTASSIUM SERPL-MCNC: 3.4 MMOL/L — LOW (ref 3.5–5.3)
POTASSIUM SERPL-SCNC: 3.4 MMOL/L — LOW (ref 3.5–5.3)
POTASSIUM SERPL-SCNC: 3.4 MMOL/L — LOW (ref 3.5–5.3)
PROT SERPL-MCNC: 7.9 G/DL — SIGNIFICANT CHANGE UP (ref 6–8.3)
PROT SERPL-MCNC: 7.9 G/DL — SIGNIFICANT CHANGE UP (ref 6–8.3)
PROTHROM AB SERPL-ACNC: 13.9 SEC — HIGH (ref 9.5–13)
PROTHROM AB SERPL-ACNC: 13.9 SEC — HIGH (ref 9.5–13)
RBC # BLD: 4.38 M/UL — SIGNIFICANT CHANGE UP (ref 4.2–5.8)
RBC # BLD: 4.38 M/UL — SIGNIFICANT CHANGE UP (ref 4.2–5.8)
RBC # FLD: 18 % — HIGH (ref 10.3–14.5)
RBC # FLD: 18 % — HIGH (ref 10.3–14.5)
RH IG SCN BLD-IMP: POSITIVE — SIGNIFICANT CHANGE UP
RH IG SCN BLD-IMP: POSITIVE — SIGNIFICANT CHANGE UP
SODIUM SERPL-SCNC: 139 MMOL/L — SIGNIFICANT CHANGE UP (ref 135–145)
SODIUM SERPL-SCNC: 139 MMOL/L — SIGNIFICANT CHANGE UP (ref 135–145)
WBC # BLD: 8.98 K/UL — SIGNIFICANT CHANGE UP (ref 3.8–10.5)
WBC # BLD: 8.98 K/UL — SIGNIFICANT CHANGE UP (ref 3.8–10.5)
WBC # FLD AUTO: 8.98 K/UL — SIGNIFICANT CHANGE UP (ref 3.8–10.5)
WBC # FLD AUTO: 8.98 K/UL — SIGNIFICANT CHANGE UP (ref 3.8–10.5)

## 2023-10-23 PROCEDURE — 88305 TISSUE EXAM BY PATHOLOGIST: CPT | Mod: 26

## 2023-10-23 PROCEDURE — 45378 DIAGNOSTIC COLONOSCOPY: CPT | Mod: GC

## 2023-10-23 PROCEDURE — 99239 HOSP IP/OBS DSCHRG MGMT >30: CPT

## 2023-10-23 PROCEDURE — 43239 EGD BIOPSY SINGLE/MULTIPLE: CPT | Mod: GC,52

## 2023-10-23 RX ORDER — POTASSIUM CHLORIDE 20 MEQ
10 PACKET (EA) ORAL
Refills: 0 | Status: COMPLETED | OUTPATIENT
Start: 2023-10-23 | End: 2023-10-23

## 2023-10-23 RX ORDER — POTASSIUM CHLORIDE 20 MEQ
10 PACKET (EA) ORAL
Refills: 0 | Status: DISCONTINUED | OUTPATIENT
Start: 2023-10-23 | End: 2023-10-23

## 2023-10-23 RX ORDER — PANTOPRAZOLE SODIUM 20 MG/1
1 TABLET, DELAYED RELEASE ORAL
Qty: 30 | Refills: 1
Start: 2023-10-23

## 2023-10-23 RX ORDER — INSULIN LISPRO 100/ML
VIAL (ML) SUBCUTANEOUS EVERY 6 HOURS
Refills: 0 | Status: DISCONTINUED | OUTPATIENT
Start: 2023-10-23 | End: 2023-10-23

## 2023-10-23 RX ORDER — FERROUS SULFATE 325(65) MG
1 TABLET ORAL
Qty: 60 | Refills: 0
Start: 2023-10-23

## 2023-10-23 RX ORDER — INSULIN LISPRO 100/ML
VIAL (ML) SUBCUTANEOUS
Refills: 0 | Status: DISCONTINUED | OUTPATIENT
Start: 2023-10-23 | End: 2023-10-23

## 2023-10-23 RX ADMIN — Medication 100 MILLIEQUIVALENT(S): at 05:28

## 2023-10-23 RX ADMIN — Medication 1 APPLICATION(S): at 05:32

## 2023-10-23 RX ADMIN — Medication 100 MILLIEQUIVALENT(S): at 07:14

## 2023-10-23 NOTE — DISCHARGE NOTE NURSING/CASE MANAGEMENT/SOCIAL WORK - NSDCFUADDAPPT_GEN_ALL_CORE_FT
Follow up with your primary care physician for further monitoring in 2 weeks. Please call to arrange appointment.     Follow up with GI Dr. Helton at [address] [place] [address] in [place] for pathology results. Please call [phone] to arrange an appointment.

## 2023-10-23 NOTE — CHART NOTE - NSCHARTNOTEFT_GEN_A_CORE
Brief GI Note:     Instructions for colonoscopy  - clear liquid diet today, NPO at midnight  - please ensure golytely is completed (to be ordered by GI fellow)  - please ensure patient drinks entire prep  - please ensure morning labs are drawn at 2am, electrolytes repleted as necessary, and Hg>7  - rest of care per primary team    Marylu Pretty MD, PGY5  GI Fellow
Pt seen in endo suite. S/p procedure. Doing well. No pain. Eager to go home.   Labs/meds/orders/reccomendations reviewed.  C-scope/EGD report reviewed. Findings reviewed and discussed w/ pt. Med stable for DC home w/ OP follow up. Refer to NM dated 10/22 for details.

## 2023-10-23 NOTE — DISCHARGE NOTE NURSING/CASE MANAGEMENT/SOCIAL WORK - PATIENT PORTAL LINK FT
You can access the FollowMyHealth Patient Portal offered by [facility] by registering at the following website: [email or web address]. By joining [facility]’s FollowMyHealth portal, you will also be able to view your health information using other applications (apps) compatible with our system.

## 2023-10-28 LAB
SURGICAL PATHOLOGY STUDY: SIGNIFICANT CHANGE UP
SURGICAL PATHOLOGY STUDY: SIGNIFICANT CHANGE UP

## 2024-10-20 ENCOUNTER — INPATIENT (INPATIENT)
Facility: HOSPITAL | Age: 39
LOS: 3 days | Discharge: HOME | End: 2024-10-24
Attending: INTERNAL MEDICINE | Admitting: INTERNAL MEDICINE
Payer: COMMERCIAL

## 2024-10-20 VITALS
TEMPERATURE: 98 F | WEIGHT: 160.06 LBS | RESPIRATION RATE: 20 BRPM | DIASTOLIC BLOOD PRESSURE: 76 MMHG | HEART RATE: 125 BPM | SYSTOLIC BLOOD PRESSURE: 108 MMHG | OXYGEN SATURATION: 99 % | HEIGHT: 70 IN

## 2024-10-20 DIAGNOSIS — D50.9 IRON DEFICIENCY ANEMIA, UNSPECIFIED: ICD-10-CM

## 2024-10-20 DIAGNOSIS — E78.5 HYPERLIPIDEMIA, UNSPECIFIED: ICD-10-CM

## 2024-10-20 DIAGNOSIS — Z29.9 ENCOUNTER FOR PROPHYLACTIC MEASURES, UNSPECIFIED: ICD-10-CM

## 2024-10-20 DIAGNOSIS — K62.5 HEMORRHAGE OF ANUS AND RECTUM: ICD-10-CM

## 2024-10-20 DIAGNOSIS — E11.9 TYPE 2 DIABETES MELLITUS WITHOUT COMPLICATIONS: ICD-10-CM

## 2024-10-20 DIAGNOSIS — D64.9 ANEMIA, UNSPECIFIED: ICD-10-CM

## 2024-10-20 DIAGNOSIS — K26.9 DUODENAL ULCER, UNSPECIFIED AS ACUTE OR CHRONIC, WITHOUT HEMORRHAGE OR PERFORATION: ICD-10-CM

## 2024-10-20 LAB
ADD ON TEST-SPECIMEN IN LAB: SIGNIFICANT CHANGE UP
ALBUMIN SERPL ELPH-MCNC: 4.4 G/DL — SIGNIFICANT CHANGE UP (ref 3.3–5)
ALP SERPL-CCNC: 80 U/L — SIGNIFICANT CHANGE UP (ref 40–120)
ALT FLD-CCNC: 18 U/L — SIGNIFICANT CHANGE UP (ref 4–41)
ANION GAP SERPL CALC-SCNC: 13 MMOL/L — SIGNIFICANT CHANGE UP (ref 7–14)
APTT BLD: 31.5 SEC — SIGNIFICANT CHANGE UP (ref 24.5–35.6)
AST SERPL-CCNC: 19 U/L — SIGNIFICANT CHANGE UP (ref 4–40)
BASOPHILS # BLD AUTO: 0.06 K/UL — SIGNIFICANT CHANGE UP (ref 0–0.2)
BASOPHILS NFR BLD AUTO: 1 % — SIGNIFICANT CHANGE UP (ref 0–2)
BILIRUB SERPL-MCNC: <0.2 MG/DL — SIGNIFICANT CHANGE UP (ref 0.2–1.2)
BLD GP AB SCN SERPL QL: NEGATIVE — SIGNIFICANT CHANGE UP
BUN SERPL-MCNC: 11 MG/DL — SIGNIFICANT CHANGE UP (ref 7–23)
CALCIUM SERPL-MCNC: 9.3 MG/DL — SIGNIFICANT CHANGE UP (ref 8.4–10.5)
CHLORIDE SERPL-SCNC: 103 MMOL/L — SIGNIFICANT CHANGE UP (ref 98–107)
CO2 SERPL-SCNC: 23 MMOL/L — SIGNIFICANT CHANGE UP (ref 22–31)
CREAT SERPL-MCNC: 0.62 MG/DL — SIGNIFICANT CHANGE UP (ref 0.5–1.3)
EGFR: 125 ML/MIN/1.73M2 — SIGNIFICANT CHANGE UP
EOSINOPHIL # BLD AUTO: 0.27 K/UL — SIGNIFICANT CHANGE UP (ref 0–0.5)
EOSINOPHIL NFR BLD AUTO: 4.5 % — SIGNIFICANT CHANGE UP (ref 0–6)
FERRITIN SERPL-MCNC: 7 NG/ML — LOW (ref 30–400)
GLUCOSE BLDC GLUCOMTR-MCNC: 105 MG/DL — HIGH (ref 70–99)
GLUCOSE BLDC GLUCOMTR-MCNC: 142 MG/DL — HIGH (ref 70–99)
GLUCOSE SERPL-MCNC: 146 MG/DL — HIGH (ref 70–99)
HCT VFR BLD CALC: 22 % — LOW (ref 39–50)
HGB BLD-MCNC: 6.1 G/DL — CRITICAL LOW (ref 13–17)
IANC: 3.71 K/UL — SIGNIFICANT CHANGE UP (ref 1.8–7.4)
IMM GRANULOCYTES NFR BLD AUTO: 2.5 % — HIGH (ref 0–0.9)
INR BLD: 1.1 RATIO — SIGNIFICANT CHANGE UP (ref 0.85–1.16)
LYMPHOCYTES # BLD AUTO: 1.36 K/UL — SIGNIFICANT CHANGE UP (ref 1–3.3)
LYMPHOCYTES # BLD AUTO: 22.6 % — SIGNIFICANT CHANGE UP (ref 13–44)
MCHC RBC-ENTMCNC: 18.1 PG — LOW (ref 27–34)
MCHC RBC-ENTMCNC: 27.7 GM/DL — LOW (ref 32–36)
MCV RBC AUTO: 65.3 FL — LOW (ref 80–100)
MONOCYTES # BLD AUTO: 0.46 K/UL — SIGNIFICANT CHANGE UP (ref 0–0.9)
MONOCYTES NFR BLD AUTO: 7.7 % — SIGNIFICANT CHANGE UP (ref 2–14)
NEUTROPHILS # BLD AUTO: 3.71 K/UL — SIGNIFICANT CHANGE UP (ref 1.8–7.4)
NEUTROPHILS NFR BLD AUTO: 61.7 % — SIGNIFICANT CHANGE UP (ref 43–77)
NRBC # BLD: 0 /100 WBCS — SIGNIFICANT CHANGE UP (ref 0–0)
NRBC # FLD: 0 K/UL — SIGNIFICANT CHANGE UP (ref 0–0)
PLATELET # BLD AUTO: 229 K/UL — SIGNIFICANT CHANGE UP (ref 150–400)
POTASSIUM SERPL-MCNC: 4 MMOL/L — SIGNIFICANT CHANGE UP (ref 3.5–5.3)
POTASSIUM SERPL-SCNC: 4 MMOL/L — SIGNIFICANT CHANGE UP (ref 3.5–5.3)
PROT SERPL-MCNC: 7.7 G/DL — SIGNIFICANT CHANGE UP (ref 6–8.3)
PROTHROM AB SERPL-ACNC: 12.7 SEC — SIGNIFICANT CHANGE UP (ref 9.9–13.4)
RBC # BLD: 3.37 M/UL — LOW (ref 4.2–5.8)
RBC # FLD: 18.6 % — HIGH (ref 10.3–14.5)
RH IG SCN BLD-IMP: POSITIVE — SIGNIFICANT CHANGE UP
SODIUM SERPL-SCNC: 139 MMOL/L — SIGNIFICANT CHANGE UP (ref 135–145)
WBC # BLD: 6.01 K/UL — SIGNIFICANT CHANGE UP (ref 3.8–10.5)
WBC # FLD AUTO: 6.01 K/UL — SIGNIFICANT CHANGE UP (ref 3.8–10.5)

## 2024-10-20 PROCEDURE — 99223 1ST HOSP IP/OBS HIGH 75: CPT

## 2024-10-20 PROCEDURE — 99285 EMERGENCY DEPT VISIT HI MDM: CPT

## 2024-10-20 RX ORDER — ROSUVASTATIN CALCIUM 10 MG
10 TABLET ORAL AT BEDTIME
Refills: 0 | Status: DISCONTINUED | OUTPATIENT
Start: 2024-10-20 | End: 2024-10-24

## 2024-10-20 RX ORDER — INSULIN LISPRO 100/ML
VIAL (ML) SUBCUTANEOUS AT BEDTIME
Refills: 0 | Status: DISCONTINUED | OUTPATIENT
Start: 2024-10-20 | End: 2024-10-24

## 2024-10-20 RX ORDER — MELATONIN 5 MG
3 TABLET ORAL AT BEDTIME
Refills: 0 | Status: DISCONTINUED | OUTPATIENT
Start: 2024-10-20 | End: 2024-10-24

## 2024-10-20 RX ORDER — MAGNESIUM, ALUMINUM HYDROXIDE 200-200 MG
30 TABLET,CHEWABLE ORAL EVERY 4 HOURS
Refills: 0 | Status: DISCONTINUED | OUTPATIENT
Start: 2024-10-20 | End: 2024-10-24

## 2024-10-20 RX ORDER — CHLORHEXIDINE GLUCONATE 40 MG/ML
1 SOLUTION TOPICAL DAILY
Refills: 0 | Status: DISCONTINUED | OUTPATIENT
Start: 2024-10-20 | End: 2024-10-24

## 2024-10-20 RX ORDER — PANTOPRAZOLE SODIUM 40 MG/1
40 TABLET, DELAYED RELEASE ORAL
Refills: 0 | Status: DISCONTINUED | OUTPATIENT
Start: 2024-10-20 | End: 2024-10-23

## 2024-10-20 RX ORDER — GLUCAGON INJECTION, SOLUTION 1 MG/.2ML
1 INJECTION, SOLUTION SUBCUTANEOUS ONCE
Refills: 0 | Status: DISCONTINUED | OUTPATIENT
Start: 2024-10-20 | End: 2024-10-24

## 2024-10-20 RX ORDER — INSULIN LISPRO 100/ML
VIAL (ML) SUBCUTANEOUS
Refills: 0 | Status: DISCONTINUED | OUTPATIENT
Start: 2024-10-20 | End: 2024-10-24

## 2024-10-20 RX ORDER — ACETAMINOPHEN 500 MG
650 TABLET ORAL EVERY 6 HOURS
Refills: 0 | Status: DISCONTINUED | OUTPATIENT
Start: 2024-10-20 | End: 2024-10-24

## 2024-10-20 RX ORDER — ONDANSETRON HYDROCHLORIDE 2 MG/ML
4 INJECTION, SOLUTION INTRAMUSCULAR; INTRAVENOUS EVERY 8 HOURS
Refills: 0 | Status: DISCONTINUED | OUTPATIENT
Start: 2024-10-20 | End: 2024-10-24

## 2024-10-20 RX ORDER — INFLUENZ VIR VAC TV P-SURF2003 15MCG/.5ML
0.5 SYRINGE (ML) INTRAMUSCULAR ONCE
Refills: 0 | Status: DISCONTINUED | OUTPATIENT
Start: 2024-10-20 | End: 2024-10-24

## 2024-10-20 RX ADMIN — CHLORHEXIDINE GLUCONATE 1 APPLICATION(S): 40 SOLUTION TOPICAL at 21:22

## 2024-10-20 RX ADMIN — PANTOPRAZOLE SODIUM 40 MILLIGRAM(S): 40 TABLET, DELAYED RELEASE ORAL at 21:24

## 2024-10-20 RX ADMIN — Medication 10 MILLIGRAM(S): at 21:24

## 2024-10-20 NOTE — ED ADULT NURSE REASSESSMENT NOTE - NS ED NURSE REASSESS COMMENT FT1
Patient revitalized fifteen minutes following the start of his blood transfusion, which the patient completed without an issue, blood rate increased from 70 mL/hour to 120 mL/hour, patient tolerating infusion well, safety precautions maintained.
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
## 2024-10-20 NOTE — ED ADULT TRIAGE NOTE - WEIGHT IN LBS
Please let her know that I ordered her diagnostic mammogram and ultrasound.     Dr. Karin James  [facility] [address]  Phone: [phone]  Fax: [phone]

## 2024-10-20 NOTE — H&P ADULT - NSICDXPASTMEDICALHX_GEN_ALL_CORE_FT
PAST MEDICAL HISTORY:  DM2 (diabetes mellitus, type 2)     Duodenal ulcer     Hemorrhoid     HLD (hyperlipidemia)

## 2024-10-20 NOTE — ED ADULT NURSE REASSESSMENT NOTE - NS ED NURSE REASSESS COMMENT FT1
Break Coverage RN: Pt A&Ox4, respirations equal and unlabored. Pt resting in stretcher at this time, offers no complaints. 1st unit of PRBC finished infusing, pt denies any s/s of transfusion reaction. Handoff report given to 311a RN for continuity of care. Primary RN Link aware and to start 2nd unit of PRBC prior to sending pt upstairs. PRBC requested from blood bank. Pending 2nd unit and transport to inpatient bed assignment. No acute distress noted. Safety maintained, bed in lowest position, side rails raised, call bell in reach.

## 2024-10-20 NOTE — H&P ADULT - TIME BILLING
Review of laboratory data, radiology results, consultants' recommendations, documentation in [place], discussion with patient/house staff and interdisciplinary staff (such as , social workers, etc). Interventions were performed as documented above.

## 2024-10-20 NOTE — ED ADULT NURSE NOTE - OBJECTIVE STATEMENT
Patient being seen for low hemoglobin result, RN expects to redraw blood in ED, no other complaints from patient

## 2024-10-20 NOTE — H&P ADULT - NSHPLABSRESULTS_GEN_ALL_CORE
6.1    6.01  )-----------( 229      ( 20 Oct 2024 12:33 )             22.0     10-20    139  |  103  |  11  ----------------------------<  146[H]  4.0   |  23  |  0.62    Ca    9.3      20 Oct 2024 12:33    TPro  7.7  /  Alb  4.4  /  TBili  <0.2  /  DBili  x   /  AST  19  /  ALT  18  /  AlkPhos  80  10-20    PT/INR - ( 20 Oct 2024 12:33 )   PT: 12.7 sec;   INR: 1.10 ratio         PTT - ( 20 Oct 2024 12:33 )  PTT:31.5 sec        CAPILLARY BLOOD GLUCOSE      POCT Blood Glucose.: 219 mg/dL (20 Oct 2024 11:37)      Urinalysis Basic - ( 20 Oct 2024 12:33 )    Color: x / Appearance: x / SG: x / pH: x  Gluc: 146 mg/dL / Ketone: x  / Bili: x / Urobili: x   Blood: x / Protein: x / Nitrite: x   Leuk Esterase: x / RBC: x / WBC x   Sq Epi: x / Non Sq Epi: x / Bacteria: x    Iron with Total Binding Capacity (10.20.24 @ 12:33)    Iron - Total Binding Capacity.: 434 ug/dL    % Saturation, Iron: 2 %    Iron Total: 10 ug/dL    Unsaturated Iron Binding Capacity: 424 ug/dL

## 2024-10-20 NOTE — ED ADULT TRIAGE NOTE - CHIEF COMPLAINT QUOTE
sent in by PMD for Hgb of 6.1 Phx of stomach ulcer reports takes Pantoprazole daily. pt is pale in appearance in triage endorsed dark stools, Additional Phx of DM type 2.

## 2024-10-20 NOTE — H&P ADULT - HISTORY OF PRESENT ILLNESS
39M with T2DM not on insulin, duodenal ulcer, HLD who presents with anemia. Pt reports several weeks of intermittent rectal bleeding which he believes is related to a hemorrhoid. He notes that with BMs he will intermittently have blood in the toilet, but no blood mixed in stool. Occasionally he will have a small clot on his toilet paper but he does not have staining on his underwear between bowel movements. Denies hematemesis, melena, abdominal pain, nausea, fevers, chills, chest pain, SOB. Pt went to his PMD for routine follow up and was found to have a hemoglobin of 6 which prompted him to go to the ED. Of note pt was admitted for iron deficiency anemia 1 year ago and was found to have duodenal ulcers without H Pylori.     In ED VSS. Pt s/p 2U pRBC.

## 2024-10-20 NOTE — H&P ADULT - PROBLEM SELECTOR PLAN 1
Pt presenting with Hb 6.1, MCV 65, serum iron 10 with saturation 2%  - likely due to chronic GI bleeding  - 2U pRBC given in ED, check post transfusion CBC in AM  - will need heme follow up outpatient given recurrent iron deficiency

## 2024-10-20 NOTE — H&P ADULT - NSHPPHYSICALEXAM_GEN_ALL_CORE
T(C): 36.9 (10-20-24 @ 15:33), Max: 37 (10-20-24 @ 15:17)  HR: 92 (10-20-24 @ 15:33) (92 - 125)  BP: 111/69 (10-20-24 @ 15:33) (108/76 - 120/76)  RR: 17 (10-20-24 @ 15:33) (17 - 20)  SpO2: 100% (10-20-24 @ 15:33) (99% - 100%)    CONSTITUTIONAL: Well groomed, no apparent distress  EYES: PERRLA and symmetric, EOMI, No conjunctival or scleral injection, non-icteric; pale conjunctiva  ENMT: Oral mucosa with moist membranes. Normal dentition; no pharyngeal injection or exudates  NECK: Supple, symmetric and without tracheal deviation   RESP: No respiratory distress, no use of accessory muscles; CTA b/l, no WRR  CV: RRR, +S1S2, no MRG; no JVD; no peripheral edema  GI: Soft, NT, ND, no rebound, no guarding; no palpable masses; no hepatosplenomegaly; no hernia palpated  MSK: No digital clubbing or cyanosis; examination of the (head/neck/spine/ribs/pelvis, RUE, LUE, RLE, LLE) without misalignment,            Normal ROM without pain, no spinal tenderness, normal muscle strength/tone  SKIN: Pale; No rashes or ulcers noted  NEURO: CN II-XII intact; no focal motor deficits, sensation intact in upper and lower extremities b/l to light touch   PSYCH: Appropriate insight/judgment; A+O x 3, mood and affect appropriate, recent/remote memory intact

## 2024-10-20 NOTE — ED PROVIDER NOTE - CLINICAL SUMMARY MEDICAL DECISION MAKING FREE TEXT BOX
39-year-old male PMH of upper GI bleed in past, anemia, send to ED for low hemoglobin after routine blood work with PCP friday  last EGD 4 months ago which was normal.  no abd pain/dark stools  VSS  abd exam benign    will check labs, type and screen, consent  reassess

## 2024-10-20 NOTE — ED ADULT NURSE REASSESSMENT NOTE - NS ED NURSE REASSESS COMMENT FT1
Patient revitalized fifteen minutes following the start of second bag of PRBC infusion, patient calm and cooperative and resting in bed.

## 2024-10-20 NOTE — ED PROVIDER NOTE - OBJECTIVE STATEMENT
39-year-old male PMH of upper GI bleed in past, anemia, send to ED for low hemoglobin.  Patient states she went to his routine routine PCP appointment in which she had basic blood work done showing he was anemic.  Patient does not admit to increased lethargy over the past week.  Patient denies any black/bloody stools. Last EGD 4 months ago which he was told was normal. pt denies chest pain, shortness of breath, dizziness, abdominal pain, hemoptysis, hematuria, N/V/D/C

## 2024-10-20 NOTE — ED ADULT NURSE REASSESSMENT NOTE - NS ED NURSE REASSESS COMMENT FT1
Patient revitalized prior to PRBC infusion, infusion initiated, RN continuously observing patient, safety precautions maintained.

## 2024-10-20 NOTE — ED PROVIDER NOTE - ATTENDING APP SHARED VISIT CONTRIBUTION OF CARE
I performed a face-to-face evaluation of the patient and performed a history and physical examination. I agree with the history and physical examination. If this was a PA visit, I personally saw the patient with the PA and performed a substantive portion of the visit including all aspects of the medical decision making.    H/o PUD UGIB. Sent by PCP for Hb ~6. Fatigue. No other anemia symptoms (for example, shortness of breath, dizziness, or chest pain). Give Protonix. Admit for EGD.

## 2024-10-20 NOTE — H&P ADULT - ASSESSMENT
39M with T2DM not on insulin, duodenal ulcer, HLD who presents with anemia likely 2/2 GI source given hemorrhoidal bleeding and history of duodenal ulcers

## 2024-10-20 NOTE — ED PROVIDER NOTE - PHYSICAL EXAMINATION
CONSTITUTIONAL: Well-appearing; well-nourished; in no apparent distress;  HEAD: Normocephalic, atraumatic;  EYES: conjunctiva pale; and sclera WNL;  ENT: normal nose; no rhinorrhea; unremarkable pharynx  NECK/LYMPH: Supple; non-tender;  CARD: Normal S1, S2; no murmurs, rubs, or gallops noted  RESP: Normal chest excursion with respiration; breath sounds clear and equal bilaterally; no wheezes, rhonchi, or rales noted  ABD/GI: soft, non-distended; non-tender; no palpable organomegaly, no pulsatile mass  RECTAL: no external hemorrhoids. no stool in vault.   EXT/MS: moves all extremities; distal pulses are normal, no pedal edema  SKIN: Normal for age and race; warm; dry; good turgor; no apparent lesions or exudate noted  NEURO: Awake, alert, oriented x 3, no gross deficits  PSYCH: Normal mood; appropriate affect

## 2024-10-20 NOTE — ED ADULT NURSE REASSESSMENT NOTE - NS ED NURSE REASSESS COMMENT FT1
Request made to lab to add on iron-ferritin levels to previous blood work, safety precautions maintained.

## 2024-10-21 LAB
A1C WITH ESTIMATED AVERAGE GLUCOSE RESULT: 6.1 % — HIGH (ref 4–5.6)
ANION GAP SERPL CALC-SCNC: 11 MMOL/L — SIGNIFICANT CHANGE UP (ref 7–14)
BUN SERPL-MCNC: 9 MG/DL — SIGNIFICANT CHANGE UP (ref 7–23)
CALCIUM SERPL-MCNC: 8.8 MG/DL — SIGNIFICANT CHANGE UP (ref 8.4–10.5)
CHLORIDE SERPL-SCNC: 105 MMOL/L — SIGNIFICANT CHANGE UP (ref 98–107)
CO2 SERPL-SCNC: 21 MMOL/L — LOW (ref 22–31)
CREAT SERPL-MCNC: 0.57 MG/DL — SIGNIFICANT CHANGE UP (ref 0.5–1.3)
EGFR: 129 ML/MIN/1.73M2 — SIGNIFICANT CHANGE UP
ESTIMATED AVERAGE GLUCOSE: 128 — SIGNIFICANT CHANGE UP
FOLATE SERPL-MCNC: 16 NG/ML — SIGNIFICANT CHANGE UP (ref 3.1–17.5)
GLUCOSE BLDC GLUCOMTR-MCNC: 112 MG/DL — HIGH (ref 70–99)
GLUCOSE BLDC GLUCOMTR-MCNC: 120 MG/DL — HIGH (ref 70–99)
GLUCOSE BLDC GLUCOMTR-MCNC: 89 MG/DL — SIGNIFICANT CHANGE UP (ref 70–99)
GLUCOSE BLDC GLUCOMTR-MCNC: 95 MG/DL — SIGNIFICANT CHANGE UP (ref 70–99)
GLUCOSE SERPL-MCNC: 90 MG/DL — SIGNIFICANT CHANGE UP (ref 70–99)
HCT VFR BLD CALC: 26.2 % — LOW (ref 39–50)
HCT VFR BLD CALC: 28.5 % — LOW (ref 39–50)
HGB BLD-MCNC: 7.7 G/DL — LOW (ref 13–17)
HGB BLD-MCNC: 8.4 G/DL — LOW (ref 13–17)
MAGNESIUM SERPL-MCNC: 2 MG/DL — SIGNIFICANT CHANGE UP (ref 1.6–2.6)
MCHC RBC-ENTMCNC: 20.1 PG — LOW (ref 27–34)
MCHC RBC-ENTMCNC: 20.4 PG — LOW (ref 27–34)
MCHC RBC-ENTMCNC: 29.4 GM/DL — LOW (ref 32–36)
MCHC RBC-ENTMCNC: 29.5 GM/DL — LOW (ref 32–36)
MCV RBC AUTO: 68.2 FL — LOW (ref 80–100)
MCV RBC AUTO: 69.3 FL — LOW (ref 80–100)
MRSA PCR RESULT.: DETECTED
NRBC # BLD: 0 /100 WBCS — SIGNIFICANT CHANGE UP (ref 0–0)
NRBC # BLD: 0 /100 WBCS — SIGNIFICANT CHANGE UP (ref 0–0)
NRBC # FLD: 0 K/UL — SIGNIFICANT CHANGE UP (ref 0–0)
NRBC # FLD: 0.02 K/UL — HIGH (ref 0–0)
PHOSPHATE SERPL-MCNC: 2.9 MG/DL — SIGNIFICANT CHANGE UP (ref 2.5–4.5)
PLATELET # BLD AUTO: 225 K/UL — SIGNIFICANT CHANGE UP (ref 150–400)
PLATELET # BLD AUTO: 229 K/UL — SIGNIFICANT CHANGE UP (ref 150–400)
POTASSIUM SERPL-MCNC: 3.7 MMOL/L — SIGNIFICANT CHANGE UP (ref 3.5–5.3)
POTASSIUM SERPL-SCNC: 3.7 MMOL/L — SIGNIFICANT CHANGE UP (ref 3.5–5.3)
RBC # BLD: 3.78 M/UL — LOW (ref 4.2–5.8)
RBC # BLD: 4.18 M/UL — LOW (ref 4.2–5.8)
RBC # FLD: 20.6 % — HIGH (ref 10.3–14.5)
RBC # FLD: 20.8 % — HIGH (ref 10.3–14.5)
S AUREUS DNA NOSE QL NAA+PROBE: DETECTED
SODIUM SERPL-SCNC: 137 MMOL/L — SIGNIFICANT CHANGE UP (ref 135–145)
VIT B12 SERPL-MCNC: 532 PG/ML — SIGNIFICANT CHANGE UP (ref 200–900)
WBC # BLD: 9.35 K/UL — SIGNIFICANT CHANGE UP (ref 3.8–10.5)
WBC # BLD: 9.55 K/UL — SIGNIFICANT CHANGE UP (ref 3.8–10.5)
WBC # FLD AUTO: 9.35 K/UL — SIGNIFICANT CHANGE UP (ref 3.8–10.5)
WBC # FLD AUTO: 9.55 K/UL — SIGNIFICANT CHANGE UP (ref 3.8–10.5)

## 2024-10-21 PROCEDURE — 99222 1ST HOSP IP/OBS MODERATE 55: CPT

## 2024-10-21 RX ORDER — MUPIROCIN 20 MG/G
1 OINTMENT TOPICAL
Refills: 0 | Status: DISCONTINUED | OUTPATIENT
Start: 2024-10-21 | End: 2024-10-24

## 2024-10-21 RX ORDER — POLYETHYLENE GLYCOL 3350 17 G/17G
17 POWDER, FOR SOLUTION ORAL DAILY
Refills: 0 | Status: DISCONTINUED | OUTPATIENT
Start: 2024-10-21 | End: 2024-10-24

## 2024-10-21 RX ORDER — SENNA 187 MG
2 TABLET ORAL AT BEDTIME
Refills: 0 | Status: DISCONTINUED | OUTPATIENT
Start: 2024-10-21 | End: 2024-10-24

## 2024-10-21 RX ADMIN — CHLORHEXIDINE GLUCONATE 1 APPLICATION(S): 40 SOLUTION TOPICAL at 12:55

## 2024-10-21 RX ADMIN — MUPIROCIN 1 APPLICATION(S): 20 OINTMENT TOPICAL at 19:05

## 2024-10-21 RX ADMIN — Medication 2 TABLET(S): at 21:48

## 2024-10-21 RX ADMIN — Medication 10 MILLIGRAM(S): at 21:48

## 2024-10-21 RX ADMIN — PANTOPRAZOLE SODIUM 40 MILLIGRAM(S): 40 TABLET, DELAYED RELEASE ORAL at 19:02

## 2024-10-21 RX ADMIN — POLYETHYLENE GLYCOL 3350 17 GRAM(S): 17 POWDER, FOR SOLUTION ORAL at 12:53

## 2024-10-21 RX ADMIN — PANTOPRAZOLE SODIUM 40 MILLIGRAM(S): 40 TABLET, DELAYED RELEASE ORAL at 06:07

## 2024-10-21 NOTE — CONSULT NOTE ADULT - ASSESSMENT
#iron deficiency anemia  *s/p 2u pRBC 6.1 --> 7.7  #Hx of PUD - DU's on EGD 10/2023 (EGD path neg for H. pylori) - no longer on PPI  #NSAID use iso sciatica pain  #Intermittent hematochezia  #internal hemorrhoids  #NIDDM on Jardiance/Janumet - last dose of both 10/20 AM    DDx: PUD likely iso active NSAID use (prior EGD path neg for HP) vs related to int hemorrhoids vs less likely malig/colitis/small bowel source of bleeding      Recommendations:  -repeat CBC at least once today (last CBC from midnight 10/21)  -trend vitals, CBC, and monitor for clinical signs of bleeding  -maintain active type and screen  -transfusion goal to maintain hemoglobin >/= 7.0 and platelets >/= 50  -avoid NSAIDs  -PPI IV BID acceptable for now  -tentative plan for EGD 10/22  -please keep NPO at midnight  -hold Jardiance (last dose 10/20 AM)      Patient is a 39 year old male with a past medical history of peptic ulcer disease (s/p endoscopy 10/2023), internal hemorrhoids, KIN, T2DM, and HLD presenting to the hospital for labs significant for anemia noted at his PCP visit on 10/18/24.    #iron deficiency anemia  *s/p 2u pRBC 6.1 --> 7.7  #Hx of PUD - DU's on EGD 10/2023 (EGD path neg for H. pylori) - no longer on PPI  #NSAID use iso sciatica pain  #Intermittent hematochezia  #Chronic constipation  #internal hemorrhoids  #NIDDM on Jardiance/Janumet - last dose of both 10/20 AM    DDx: PUD likely iso active NSAID use (prior EGD path neg for HP) vs related to int hemorrhoids vs less likely malig/colitis/small bowel source of bleeding      Recommendations:  -repeat CBC at least once today (last CBC from midnight 10/21)  -trend vitals, CBC, and monitor for clinical signs of bleeding  -maintain active type and screen  -transfusion goal to maintain hemoglobin >/= 7.0 and platelets >/= 50  -avoid NSAIDs  -PPI IV BID acceptable for now  -miralax qD + senna qHs   -tentative plan for EGD 10/22  -please keep NPO at midnight  -hold Jardiance (last dose 10/20 AM)

## 2024-10-21 NOTE — CONSULT NOTE ADULT - ATTENDING COMMENTS
This is a 40 yo man with a history of DM, HLD, and PUD diagnosed one year ago when he presented with anemia and EGD confirmed a duodenal ulcer, H. pylori negative. He underwent a repeat EGD in March 2024 that documented healing. He also had a colonoscopy in 2023 that was negative except for hemorrhoids. and BPS = 8.  He has continued to take Naproxen for sciatica and now presents with a Hgb of 6.1 (baseline Hgb 13 in June 2024). He denies abdominal pain, nausea, vomiting, or early satiety. He does have bleeding hemorrhoids, sometimes spurting, with every BM. On exam, he looks comfortable in no distress. Abdomen is soft, non-distended, non-tender to palpation. Labs reviewed. A/P: iron deficiency anemia - suspect recurrent NSAID-related PUD, recommend EGD. If EGD negative, hemorrhoidal bleeding could be the cause, Recommend colorectal surgery consultation.
Date of service 10/21    39M with history of gastric ulcers here with anemia. reports BRBPR ongoing for some time. Colonoscopy 1 year ago and told he has hemorrhoids. Bleeding worse with straining. Has not tried any tx for hemorrhoids prior  VSS  Hb 6.1 --> 8.4 after transfusion  Abd soft nt/nd  Rectal with noninflamed ext hermorrhoids, KURT without blood, no ttp    Possible hemorrhoidal bleeding, however would rec GI eval for EGD to eval for UGI lesion/ulcer causing anemia. Flex sig at time of EGD to eval for lower GI etiology as well  Bowel regimen  Will follow for endoscopy results

## 2024-10-21 NOTE — CONSULT NOTE ADULT - SUBJECTIVE AND OBJECTIVE BOX
Chief Complaint:  Patient is a 39y old  Male who presents with a chief complaint of Anemia (20 Oct 2024 17:22)      HPI:      Allergies:  No Known Allergies    Home Medications:  Naproxen 200mg BiD    Hospital Medications:  acetaminophen     Tablet .. 650 milliGRAM(s) Oral every 6 hours PRN  aluminum hydroxide/magnesium hydroxide/simethicone Suspension 30 milliLiter(s) Oral every 4 hours PRN  chlorhexidine 2% Cloths 1 Application(s) Topical daily  dextrose 5%. 1000 milliLiter(s) IV Continuous <Continuous>  dextrose 5%. 1000 milliLiter(s) IV Continuous <Continuous>  dextrose 50% Injectable 25 Gram(s) IV Push once  dextrose 50% Injectable 25 Gram(s) IV Push once  dextrose 50% Injectable 12.5 Gram(s) IV Push once  dextrose Oral Gel 15 Gram(s) Oral once PRN  glucagon  Injectable 1 milliGRAM(s) IntraMuscular once  influenza   Vaccine 0.5 milliLiter(s) IntraMuscular once  insulin lispro (ADMELOG) corrective regimen sliding scale   SubCutaneous at bedtime  insulin lispro (ADMELOG) corrective regimen sliding scale   SubCutaneous three times a day before meals  melatonin 3 milliGRAM(s) Oral at bedtime PRN  ondansetron Injectable 4 milliGRAM(s) IV Push every 8 hours PRN  pantoprazole  Injectable 40 milliGRAM(s) IV Push two times a day  polyethylene glycol 3350 17 Gram(s) Oral daily  rosuvastatin 10 milliGRAM(s) Oral at bedtime  senna 2 Tablet(s) Oral at bedtime      PMHX/PSHX:  No pertinent past medical history    DM2 (diabetes mellitus, type 2)    HLD (hyperlipidemia)    Duodenal ulcer    Hemorrhoid    No significant past surgical history      Family history:      Denies family history of colon cancer/polyps, stomach cancer/polyps, pancreatic cancer/masses, liver cancer/disease, ovarian cancer and endometrial cancer.    Social History:     Tob: Denies  EtOH: Social  Illicit Drugs: Denies    ROS:   General:  No wt loss, fevers, chills, night sweats, fatigue  Eyes:  Good vision, no reported pain  ENT:  No sore throat, pain, runny nose, dysphagia  CV:  No pain, palpitations, hypo/hypertension  Pulm:  No dyspnea, cough, tachypnea, wheezing  GI:  As per HPI  :  No pain, bleeding, incontinence, nocturia  Muscle:  No pain, weakness  Neuro:  No weakness, tingling, memory problems  Psych:  No fatigue, insomnia, mood problems, depression  Endocrine:  No polyuria, polydipsia, cold/heat intolerance  Heme:  No petechiae, ecchymosis, easy bruisability  Skin:  No rash, tattoos, scars, edema    PHYSICAL EXAM:   GENERAL:  No acute distress  HEENT:  Normocephalic/atraumatic  CHEST:  No accessory muscle use  HEART:  Regular rate and rhythm  ABDOMEN:  Soft, non-tender, non-distended  EXTREMITIES: No b/l LE edema  SKIN:  No rash  NEURO:  Alert and oriented x 3    Vital Signs:  Vital Signs Last 24 Hrs  T(C): 36.7 (21 Oct 2024 10:20), Max: 37.3 (20 Oct 2024 21:48)  T(F): 98.1 (21 Oct 2024 10:20), Max: 99.1 (20 Oct 2024 21:48)  HR: 90 (21 Oct 2024 10:20) (90 - 101)  BP: 112/72 (21 Oct 2024 10:20) (111/69 - 128/84)  BP(mean): --  RR: 17 (21 Oct 2024 10:20) (16 - 18)  SpO2: 100% (21 Oct 2024 10:20) (100% - 100%)    Parameters below as of 21 Oct 2024 10:20  Patient On (Oxygen Delivery Method): room air      Daily     Daily     LABS:                        7.7    9.55  )-----------( 225      ( 21 Oct 2024 00:30 )             26.2     Mean Cell Volume: 69.3 fL (10-21-24 @ 00:30)    10-21    137  |  105  |  9   ----------------------------<  90  3.7   |  21[L]  |  0.57    Ca    8.8      21 Oct 2024 00:30  Phos  2.9     10-21  Mg     2.00     10-21    TPro  7.7  /  Alb  4.4  /  TBili  <0.2  /  DBili  x   /  AST  19  /  ALT  18  /  AlkPhos  80  10-20    LIVER FUNCTIONS - ( 20 Oct 2024 12:33 )  Alb: 4.4 g/dL / Pro: 7.7 g/dL / ALK PHOS: 80 U/L / ALT: 18 U/L / AST: 19 U/L / GGT: x           PT/INR - ( 20 Oct 2024 12:33 )   PT: 12.7 sec;   INR: 1.10 ratio         PTT - ( 20 Oct 2024 12:33 )  PTT:31.5 sec  Urinalysis Basic - ( 21 Oct 2024 00:30 )    Color: x / Appearance: x / SG: x / pH: x  Gluc: 90 mg/dL / Ketone: x  / Bili: x / Urobili: x   Blood: x / Protein: x / Nitrite: x   Leuk Esterase: x / RBC: x / WBC x   Sq Epi: x / Non Sq Epi: x / Bacteria: x                              7.7    9.55  )-----------( 225      ( 21 Oct 2024 00:30 )             26.2                         6.1    6.01  )-----------( 229      ( 20 Oct 2024 12:33 )             22.0       Imaging:  < from: Upper Endoscopy (10.23.23 @ 09:09) >  Findings:       The examined esophagus was normal.       The Z-line was regular.       Hematin (altered blood/coffee-ground-like material) was found in the        gastric body and in the gastric antrum.       Few non-obstructing non-bleeding superficial duodenal ulcers with no        stigmata of bleeding were found in the duodenal bulb. Thelargest lesion        was 4 mm in largest dimension.       The second portion of the duodenum was normal.       Biopsies were taken with a cold forceps in the gastric body, at the        incisura and in the gastric antrum for Helicobacter pylori testing.                                                                                   Impression:          - Normal esophagus.                       - Z-line regular.                       - Hematin (altered blood/coffee-ground-like material) in                     the gastric body and in the gastric antrum.                       - Multiple superficial non-bleeding duodenal ulcers with                        no stigmata of bleeding at the duodenal bulb, likely                        cause of the patient's anemia.                       - Normal second portion of the duodenum.                       - Gastric biopsies were taken with a cold forceps for                        Helicobacter pylori testing.  Recommendation:      - Return patient to hospital nance for ongoing care.                       - Await pathology results. Treat if H pylori positive.                       - Resume regular diet today.                       - Patient should continue pantoprazole 40 mg daily for 8                weeks.                       - Patient should be discharged on iron supplementation.                        Can be discharged today from GI perspective.                       - Follow up with GI in one month. Pt can follow with his                    own GI or with me (gave him my card)                                                                                   Attending Participation:       I was present and participated during the entire procedure from        insertion toremoval of the endoscope.                                                                                     _________________________  EDEN RINCON MD  10/23/2023 11:15:30 AM    < end of copied text >  < from: Colonoscopy (10.23.23 @ 09:10) >  Findings:       Hemorrhoids were found on perianal exam.       Non-bleeding internal hemorrhoids were found during retroflexion. The        hemorrhoids were large.       Normalmucosa was found in the entire colon.       The terminal ileum appeared normal.                                                                                   Impression:          - Hemorrhoids found on perianal exam.                       - Non-bleeding internal hemorrhoids. Likely cause of the                        patient's hematochezia.                       - Normal mucosa in the entire examined colon.                       - The examined portion of the ileum was normal.       - No specimens collected.  Recommendation:      - Return patient to hospital nance for ongoing care.                       - Resume regular diet today.                       - Patient should be discharged on iron supplementation.       - Please refer to separate EGD report.                                                                                   Attending Participation:       I was present and participated during the entire procedure from        insertion to removal of the endoscope.                                                                                     _________________________  EDEN RINCON MD  10/23/2023 11:14:06 AM    < end of copied text >           Chief Complaint:  Patient is a 39y old  Male who presents with a chief complaint of Anemia (20 Oct 2024 17:22)      HPI: Patient is a 39 year old male with a past medical history significant for peptic ulcer disease (s/p endoscopy 10/2023), internal hemorrhoids, KIN, T2DM, and HLD presenting to the hospital for labs significant for anemia noted at his PCP visit on 10/18/24. He reports that over the past few weeks he has experienced increasing fatigue and has been sleeping more. He denies dizziness, lightheadedness, abdominal pain, nausea, vomiting, and diarrhea. He has no changes to his appetite. He reports chronic rectal bleeding from his hemorrhoids and sees spots of crimson blood in the toilet after bowel movements. He also sees pieces of clotted blood on his toilet paper after he wipes. He says that he has a history of straining to pass stool. He reports daily naproxen use over the past two months for sciatica pain. No other acute concerns.    Previous EGD (10/2023) significant for multiple superficial duodenal ulcers, negative for H. pylori. Colonoscopy (10/2023) significant for non-bleeding internal hemorrhoids. Repeat EGD in 3/2024 showed resolution of duodenal ulcers. Patient follows with outpatient gastroenterologist Dr Gallegos.       Allergies:  No Known Allergies    Home Medications:  Naproxen 200mg BiD    Hospital Medications:  acetaminophen     Tablet .. 650 milliGRAM(s) Oral every 6 hours PRN  aluminum hydroxide/magnesium hydroxide/simethicone Suspension 30 milliLiter(s) Oral every 4 hours PRN  chlorhexidine 2% Cloths 1 Application(s) Topical daily  dextrose 5%. 1000 milliLiter(s) IV Continuous <Continuous>  dextrose 5%. 1000 milliLiter(s) IV Continuous <Continuous>  dextrose 50% Injectable 25 Gram(s) IV Push once  dextrose 50% Injectable 25 Gram(s) IV Push once  dextrose 50% Injectable 12.5 Gram(s) IV Push once  dextrose Oral Gel 15 Gram(s) Oral once PRN  glucagon  Injectable 1 milliGRAM(s) IntraMuscular once  influenza   Vaccine 0.5 milliLiter(s) IntraMuscular once  insulin lispro (ADMELOG) corrective regimen sliding scale   SubCutaneous at bedtime  insulin lispro (ADMELOG) corrective regimen sliding scale   SubCutaneous three times a day before meals  melatonin 3 milliGRAM(s) Oral at bedtime PRN  ondansetron Injectable 4 milliGRAM(s) IV Push every 8 hours PRN  pantoprazole  Injectable 40 milliGRAM(s) IV Push two times a day  polyethylene glycol 3350 17 Gram(s) Oral daily  rosuvastatin 10 milliGRAM(s) Oral at bedtime  senna 2 Tablet(s) Oral at bedtime      PMHX/PSHX:  No pertinent past medical history    DM2 (diabetes mellitus, type 2)    HLD (hyperlipidemia)    Duodenal ulcer    Hemorrhoid    No significant past surgical history      Family history:      Denies family history of colon cancer/polyps, stomach cancer/polyps, pancreatic cancer/masses, liver cancer/disease, ovarian cancer and endometrial cancer.    Social History:     Tob: Denies  EtOH: Social  Illicit Drugs: Denies    ROS:   General:  No wt loss, fevers, chills, night sweats, fatigue  Eyes:  Good vision, no reported pain  ENT:  No sore throat, pain, runny nose, dysphagia  CV:  No pain, palpitations, hypo/hypertension  Pulm:  No dyspnea, cough, tachypnea, wheezing  GI:  As per HPI  :  No pain, bleeding, incontinence, nocturia  Muscle:  No pain, weakness  Neuro:  No weakness, tingling, memory problems  Psych:  No fatigue, insomnia, mood problems, depression  Endocrine:  No polyuria, polydipsia, cold/heat intolerance  Heme:  No petechiae, ecchymosis, easy bruisability  Skin:  No rash, tattoos, scars, edema    PHYSICAL EXAM:   GENERAL:  No acute distress  HEENT:  Normocephalic/atraumatic  CHEST:  No accessory muscle use  HEART:  Regular rate and rhythm  ABDOMEN:  Soft, non-tender, non-distended  EXTREMITIES: No b/l LE edema  SKIN:  No rash  NEURO:  Alert and oriented x 3    Vital Signs:  Vital Signs Last 24 Hrs  T(C): 36.7 (21 Oct 2024 10:20), Max: 37.3 (20 Oct 2024 21:48)  T(F): 98.1 (21 Oct 2024 10:20), Max: 99.1 (20 Oct 2024 21:48)  HR: 90 (21 Oct 2024 10:20) (90 - 101)  BP: 112/72 (21 Oct 2024 10:20) (111/69 - 128/84)  BP(mean): --  RR: 17 (21 Oct 2024 10:20) (16 - 18)  SpO2: 100% (21 Oct 2024 10:20) (100% - 100%)    Parameters below as of 21 Oct 2024 10:20  Patient On (Oxygen Delivery Method): room air      Daily     Daily     LABS:                        7.7    9.55  )-----------( 225      ( 21 Oct 2024 00:30 )             26.2     Mean Cell Volume: 69.3 fL (10-21-24 @ 00:30)    10-21    137  |  105  |  9   ----------------------------<  90  3.7   |  21[L]  |  0.57    Ca    8.8      21 Oct 2024 00:30  Phos  2.9     10-21  Mg     2.00     10-21    TPro  7.7  /  Alb  4.4  /  TBili  <0.2  /  DBili  x   /  AST  19  /  ALT  18  /  AlkPhos  80  10-20    LIVER FUNCTIONS - ( 20 Oct 2024 12:33 )  Alb: 4.4 g/dL / Pro: 7.7 g/dL / ALK PHOS: 80 U/L / ALT: 18 U/L / AST: 19 U/L / GGT: x           PT/INR - ( 20 Oct 2024 12:33 )   PT: 12.7 sec;   INR: 1.10 ratio         PTT - ( 20 Oct 2024 12:33 )  PTT:31.5 sec  Urinalysis Basic - ( 21 Oct 2024 00:30 )    Color: x / Appearance: x / SG: x / pH: x  Gluc: 90 mg/dL / Ketone: x  / Bili: x / Urobili: x   Blood: x / Protein: x / Nitrite: x   Leuk Esterase: x / RBC: x / WBC x   Sq Epi: x / Non Sq Epi: x / Bacteria: x                              7.7    9.55  )-----------( 225      ( 21 Oct 2024 00:30 )             26.2                         6.1    6.01  )-----------( 229      ( 20 Oct 2024 12:33 )             22.0       Imaging:  < from: Upper Endoscopy (10.23.23 @ 09:09) >  Findings:       The examined esophagus was normal.       The Z-line was regular.       Hematin (altered blood/coffee-ground-like material) was found in the        gastric body and in the gastric antrum.       Few non-obstructing non-bleeding superficial duodenal ulcers with no        stigmata of bleeding were found in the duodenal bulb. Thelargest lesion        was 4 mm in largest dimension.       The second portion of the duodenum was normal.       Biopsies were taken with a cold forceps in the gastric body, at the        incisura and in the gastric antrum for Helicobacter pylori testing.                                                                                   Impression:          - Normal esophagus.                       - Z-line regular.                       - Hematin (altered blood/coffee-ground-like material) in                     the gastric body and in the gastric antrum.                       - Multiple superficial non-bleeding duodenal ulcers with                        no stigmata of bleeding at the duodenal bulb, likely                        cause of the patient's anemia.                       - Normal second portion of the duodenum.                       - Gastric biopsies were taken with a cold forceps for                        Helicobacter pylori testing.  Recommendation:      - Return patient to hospital nance for ongoing care.                       - Await pathology results. Treat if H pylori positive.                       - Resume regular diet today.                       - Patient should continue pantoprazole 40 mg daily for 8                weeks.                       - Patient should be discharged on iron supplementation.                        Can be discharged today from GI perspective.                       - Follow up with GI in one month. Pt can follow with his                    own GI or with me (gave him my card)                                                                                   Attending Participation:       I was present and participated during the entire procedure from        insertion toremoval of the endoscope.                                                                                     _________________________  EDEN RINCON MD  10/23/2023 11:15:30 AM    < end of copied text >  < from: Colonoscopy (10.23.23 @ 09:10) >  Findings:       Hemorrhoids were found on perianal exam.       Non-bleeding internal hemorrhoids were found during retroflexion. The        hemorrhoids were large.       Normalmucosa was found in the entire colon.       The terminal ileum appeared normal.                                                                                   Impression:          - Hemorrhoids found on perianal exam.                       - Non-bleeding internal hemorrhoids. Likely cause of the                        patient's hematochezia.                       - Normal mucosa in the entire examined colon.                       - The examined portion of the ileum was normal.       - No specimens collected.  Recommendation:      - Return patient to hospital nance for ongoing care.                       - Resume regular diet today.                       - Patient should be discharged on iron supplementation.       - Please refer to separate EGD report.                                                                                   Attending Participation:       I was present and participated during the entire procedure from        insertion to removal of the endoscope.                                                                                     _________________________  EDEN RINCON MD  10/23/2023 11:14:06 AM    < end of copied text >

## 2024-10-21 NOTE — PROVIDER CONTACT NOTE (CRITICAL VALUE NOTIFICATION) - BACKGROUND
71 year old male presented with tooth pain and halitosis. Patient found to be pancytopenic on arrival, patient had multiple abscesses visualized on CT.

## 2024-10-21 NOTE — CONSULT NOTE ADULT - SUBJECTIVE AND OBJECTIVE BOX
GENERAL SURGERY CONSULT NOTE  Consulting surgical team: A team  Consulting attending: Navdeep    HPI:  HPI:  39M with T2DM not on insulin, duodenal ulcer, HLD who presents with anemia. Pt reports several weeks of intermittent rectal bleeding which he believes is related to a hemorrhoid. He notes that with BMs he will intermittently have blood in the toilet, but no blood mixed in stool. Occasionally he will have a small clot on his toilet paper but he does not have staining on his underwear between bowel movements. Denies hematemesis, melena, abdominal pain, nausea, fevers, chills, chest pain, SOB. Pt went to his PMD for routine follow up and was found to have a hemoglobin of 6 which prompted him to go to the ED. Of note pt was admitted for iron deficiency anemia 1 year ago and was found to have duodenal ulcers without H Pylori.     In ED VSS. Pt s/p 2U pRBC. (20 Oct 2024 17:22)      PAST MEDICAL HISTORY:  No pertinent past medical history    DM2 (diabetes mellitus, type 2)    HLD (hyperlipidemia)    Duodenal ulcer    Hemorrhoid        PAST SURGICAL HISTORY:  No significant past surgical history        SOCIAL HISTORY:  - Denies EtOH abuse, smoking, IVDA    MEDICATIONS:  acetaminophen     Tablet .. 650 milliGRAM(s) Oral every 6 hours PRN  aluminum hydroxide/magnesium hydroxide/simethicone Suspension 30 milliLiter(s) Oral every 4 hours PRN  chlorhexidine 2% Cloths 1 Application(s) Topical daily  dextrose 5%. 1000 milliLiter(s) IV Continuous <Continuous>  dextrose 5%. 1000 milliLiter(s) IV Continuous <Continuous>  dextrose 50% Injectable 12.5 Gram(s) IV Push once  dextrose 50% Injectable 25 Gram(s) IV Push once  dextrose 50% Injectable 25 Gram(s) IV Push once  dextrose Oral Gel 15 Gram(s) Oral once PRN  glucagon  Injectable 1 milliGRAM(s) IntraMuscular once  influenza   Vaccine 0.5 milliLiter(s) IntraMuscular once  insulin lispro (ADMELOG) corrective regimen sliding scale   SubCutaneous three times a day before meals  insulin lispro (ADMELOG) corrective regimen sliding scale   SubCutaneous at bedtime  melatonin 3 milliGRAM(s) Oral at bedtime PRN  ondansetron Injectable 4 milliGRAM(s) IV Push every 8 hours PRN  pantoprazole  Injectable 40 milliGRAM(s) IV Push two times a day  polyethylene glycol 3350 17 Gram(s) Oral daily  rosuvastatin 10 milliGRAM(s) Oral at bedtime  senna 2 Tablet(s) Oral at bedtime      ALLERGIES:  No Known Allergies      VITALS & I/Os:  Vital Signs Last 24 Hrs  T(C): 36.7 (21 Oct 2024 10:20), Max: 37.3 (20 Oct 2024 21:48)  T(F): 98.1 (21 Oct 2024 10:20), Max: 99.1 (20 Oct 2024 21:48)  HR: 90 (21 Oct 2024 10:20) (90 - 101)  BP: 112/72 (21 Oct 2024 10:20) (111/69 - 128/84)  BP(mean): --  RR: 17 (21 Oct 2024 10:20) (16 - 18)  SpO2: 100% (21 Oct 2024 10:20) (100% - 100%)    Parameters below as of 21 Oct 2024 10:20  Patient On (Oxygen Delivery Method): room air        I&O's Summary    20 Oct 2024 07:01  -  21 Oct 2024 07:00  --------------------------------------------------------  IN: 240 mL / OUT: 150 mL / NET: 90 mL    21 Oct 2024 07:01  -  21 Oct 2024 14:55  --------------------------------------------------------  IN: 350 mL / OUT: 100 mL / NET: 250 mL        PHYSICAL EXAM:  GEN: resting comfortably in bed, in NAD, pale  HEENT: normocephalic, atraumatic, nonicteric  CV: hemodynamically stable  PULM: nonlabored breathing on RA  ABD: soft, non-distended, non-tender. No rebound/guarding  RECTUM: Chaperoned by Tricia ** RN. good rectal tone, no fissures or masses, no blood or clot noted  EXTREMITIES: grossly symmetric, WWP  NEURO: AAOx4, no focal neuro deficits; CN II-IX intact    LABS:                        7.7    9.55  )-----------( 225      ( 21 Oct 2024 00:30 )             26.2     10-21    137  |  105  |  9   ----------------------------<  90  3.7   |  21[L]  |  0.57    Ca    8.8      21 Oct 2024 00:30  Phos  2.9     10-21  Mg     2.00     10-21    TPro  7.7  /  Alb  4.4  /  TBili  <0.2  /  DBili  x   /  AST  19  /  ALT  18  /  AlkPhos  80  10-20    Lactate:    PT/INR - ( 20 Oct 2024 12:33 )   PT: 12.7 sec;   INR: 1.10 ratio         PTT - ( 20 Oct 2024 12:33 )  PTT:31.5 sec          Urinalysis Basic - ( 21 Oct 2024 00:30 )    Color: x / Appearance: x / SG: x / pH: x  Gluc: 90 mg/dL / Ketone: x  / Bili: x / Urobili: x   Blood: x / Protein: x / Nitrite: x   Leuk Esterase: x / RBC: x / WBC x   Sq Epi: x / Non Sq Epi: x / Bacteria: x        IMAGING:   GENERAL SURGERY CONSULT NOTE  Consulting surgical team: A team  Consulting attending: Navdeep    HPI:  40 yo male with history of HLD, DM, PUD, KIN, and internal hemorrhoids admitted for anemia discovered on outpatient labs. Hgb 6.1 in ED, improved to 7.7 after 2u pRBC. Prior Hgb 9.6 1 year ago during admission for anemia during which workup revealed peptic ulcers (- for H. pylori) and internal hemorrhoids. Patient states he has had many weeks of bleeding after BMs, describing blood on the toilet paper after wiping or dripping into the bowl; on occasion has had blood "spurting" into the bowl or has had blood clots on the toilet tissue. BMs are otherwise benign, soft and brown, though admits to having to strain sometimes. States he was recommended to see a CRS for treatment of his hemorrhoids but did not follow through as he was nervous. Denies abdominal pain, CP, SOB, fevers, chills, N/V/D, melena. Last colonoscopy 10/2023 with internal hemorrhoids, otherwise negative; 10/2023 EGD with duodenal ulcers which resolved on repeat EGD 3/2024.      PAST MEDICAL HISTORY:  DM2 (diabetes mellitus, type 2)  HLD (hyperlipidemia)  Duodenal ulcer  Hemorrhoid    PAST SURGICAL HISTORY:  No significant past surgical history    SOCIAL HISTORY:  - Denies EtOH abuse, smoking, IVDA    MEDICATIONS:  acetaminophen     Tablet .. 650 milliGRAM(s) Oral every 6 hours PRN  aluminum hydroxide/magnesium hydroxide/simethicone Suspension 30 milliLiter(s) Oral every 4 hours PRN  chlorhexidine 2% Cloths 1 Application(s) Topical daily  dextrose 5%. 1000 milliLiter(s) IV Continuous <Continuous>  dextrose 5%. 1000 milliLiter(s) IV Continuous <Continuous>  dextrose 50% Injectable 12.5 Gram(s) IV Push once  dextrose 50% Injectable 25 Gram(s) IV Push once  dextrose 50% Injectable 25 Gram(s) IV Push once  dextrose Oral Gel 15 Gram(s) Oral once PRN  glucagon  Injectable 1 milliGRAM(s) IntraMuscular once  influenza   Vaccine 0.5 milliLiter(s) IntraMuscular once  insulin lispro (ADMELOG) corrective regimen sliding scale   SubCutaneous three times a day before meals  insulin lispro (ADMELOG) corrective regimen sliding scale   SubCutaneous at bedtime  melatonin 3 milliGRAM(s) Oral at bedtime PRN  ondansetron Injectable 4 milliGRAM(s) IV Push every 8 hours PRN  pantoprazole  Injectable 40 milliGRAM(s) IV Push two times a day  polyethylene glycol 3350 17 Gram(s) Oral daily  rosuvastatin 10 milliGRAM(s) Oral at bedtime  senna 2 Tablet(s) Oral at bedtime      ALLERGIES:  No Known Allergies      VITALS & I/Os:  Vital Signs Last 24 Hrs  T(C): 36.7 (21 Oct 2024 10:20), Max: 37.3 (20 Oct 2024 21:48)  T(F): 98.1 (21 Oct 2024 10:20), Max: 99.1 (20 Oct 2024 21:48)  HR: 90 (21 Oct 2024 10:20) (90 - 101)  BP: 112/72 (21 Oct 2024 10:20) (111/69 - 128/84)  BP(mean): --  RR: 17 (21 Oct 2024 10:20) (16 - 18)  SpO2: 100% (21 Oct 2024 10:20) (100% - 100%)    Parameters below as of 21 Oct 2024 10:20  Patient On (Oxygen Delivery Method): room air        I&O's Summary    20 Oct 2024 07:01  -  21 Oct 2024 07:00  --------------------------------------------------------  IN: 240 mL / OUT: 150 mL / NET: 90 mL    21 Oct 2024 07:01  -  21 Oct 2024 14:55  --------------------------------------------------------  IN: 350 mL / OUT: 100 mL / NET: 250 mL        PHYSICAL EXAM:  GEN: resting comfortably in bed, in NAD, pale  HEENT: normocephalic, atraumatic, nonicteric  CV: hemodynamically stable  PULM: nonlabored breathing on RA  ABD: soft, non-distended, non-tender. No rebound/guarding  RECTUM: Chaperoned by Tricia Amaya RN. good rectal tone, no fissures or masses, no blood or clot noted  EXTREMITIES: grossly symmetric, WWP  NEURO: AAOx4, no focal neuro deficits; CN II-IX intact    LABS:                        7.7    9.55  )-----------( 225      ( 21 Oct 2024 00:30 )             26.2     10-21    137  |  105  |  9   ----------------------------<  90  3.7   |  21[L]  |  0.57    Ca    8.8      21 Oct 2024 00:30  Phos  2.9     10-21  Mg     2.00     10-21    TPro  7.7  /  Alb  4.4  /  TBili  <0.2  /  DBili  x   /  AST  19  /  ALT  18  /  AlkPhos  80  10-20    Lactate:    PT/INR - ( 20 Oct 2024 12:33 )   PT: 12.7 sec;   INR: 1.10 ratio         PTT - ( 20 Oct 2024 12:33 )  PTT:31.5 sec          Urinalysis Basic - ( 21 Oct 2024 00:30 )    Color: x / Appearance: x / SG: x / pH: x  Gluc: 90 mg/dL / Ketone: x  / Bili: x / Urobili: x   Blood: x / Protein: x / Nitrite: x   Leuk Esterase: x / RBC: x / WBC x   Sq Epi: x / Non Sq Epi: x / Bacteria: x        IMAGING:   GENERAL SURGERY CONSULT NOTE  Consulting surgical team: A team  Consulting attending: Navdeep    HPI:  40 yo male with history of HLD, DM, PUD, KIN, and internal hemorrhoids admitted for anemia discovered on outpatient labs. Hgb 6.1 in ED, improved to 7.7 after 2u pRBC. Prior Hgb 9.6 1 year ago during admission for anemia during which workup revealed peptic ulcers (- for H. pylori) and internal hemorrhoids. Patient states he has had many weeks of bleeding after BMs, describing blood on the toilet paper after wiping or dripping into the bowl; on occasion has had blood "spurting" into the bowl or has had blood clots on the toilet tissue. BMs are otherwise benign, soft and brown, though admits to having to strain sometimes. States he was recommended to see a CRS for treatment of his hemorrhoids but did not follow through as he was nervous. Denies abdominal pain, CP, SOB, fevers, chills, N/V/D, melena. Last colonoscopy 10/2023 with internal hemorrhoids, otherwise negative; 10/2023 EGD with duodenal ulcers which resolved on repeat EGD 3/2024.      PAST MEDICAL HISTORY:  DM2 (diabetes mellitus, type 2)  HLD (hyperlipidemia)  Duodenal ulcer  Hemorrhoid    PAST SURGICAL HISTORY:  No significant past surgical history    SOCIAL HISTORY:  - Denies EtOH abuse, smoking, IVDA    MEDICATIONS:  acetaminophen     Tablet .. 650 milliGRAM(s) Oral every 6 hours PRN  aluminum hydroxide/magnesium hydroxide/simethicone Suspension 30 milliLiter(s) Oral every 4 hours PRN  chlorhexidine 2% Cloths 1 Application(s) Topical daily  dextrose 5%. 1000 milliLiter(s) IV Continuous <Continuous>  dextrose 5%. 1000 milliLiter(s) IV Continuous <Continuous>  dextrose 50% Injectable 12.5 Gram(s) IV Push once  dextrose 50% Injectable 25 Gram(s) IV Push once  dextrose 50% Injectable 25 Gram(s) IV Push once  dextrose Oral Gel 15 Gram(s) Oral once PRN  glucagon  Injectable 1 milliGRAM(s) IntraMuscular once  influenza   Vaccine 0.5 milliLiter(s) IntraMuscular once  insulin lispro (ADMELOG) corrective regimen sliding scale   SubCutaneous three times a day before meals  insulin lispro (ADMELOG) corrective regimen sliding scale   SubCutaneous at bedtime  melatonin 3 milliGRAM(s) Oral at bedtime PRN  ondansetron Injectable 4 milliGRAM(s) IV Push every 8 hours PRN  pantoprazole  Injectable 40 milliGRAM(s) IV Push two times a day  polyethylene glycol 3350 17 Gram(s) Oral daily  rosuvastatin 10 milliGRAM(s) Oral at bedtime  senna 2 Tablet(s) Oral at bedtime      ALLERGIES:  No Known Allergies      VITALS & I/Os:  Vital Signs Last 24 Hrs  T(C): 36.7 (21 Oct 2024 10:20), Max: 37.3 (20 Oct 2024 21:48)  T(F): 98.1 (21 Oct 2024 10:20), Max: 99.1 (20 Oct 2024 21:48)  HR: 90 (21 Oct 2024 10:20) (90 - 101)  BP: 112/72 (21 Oct 2024 10:20) (111/69 - 128/84)  BP(mean): --  RR: 17 (21 Oct 2024 10:20) (16 - 18)  SpO2: 100% (21 Oct 2024 10:20) (100% - 100%)    Parameters below as of 21 Oct 2024 10:20  Patient On (Oxygen Delivery Method): room air        I&O's Summary    20 Oct 2024 07:01  -  21 Oct 2024 07:00  --------------------------------------------------------  IN: 240 mL / OUT: 150 mL / NET: 90 mL    21 Oct 2024 07:01  -  21 Oct 2024 14:55  --------------------------------------------------------  IN: 350 mL / OUT: 100 mL / NET: 250 mL        PHYSICAL EXAM:  GEN: resting comfortably in bed, in NAD, pale  HEENT: normocephalic, atraumatic, nonicteric  CV: hemodynamically stable  PULM: nonlabored breathing on RA  ABD: soft, non-distended, non-tender. No rebound/guarding  RECTUM: Chaperoned by Tricia Gibbons RN. Good rectal tone, no masses or hemorrhoids appreciated, no blood or clot noted  EXTREMITIES: grossly symmetric, WWP  NEURO: AAOx4, no focal neuro deficits; CN II-IX intact    LABS:                        7.7    9.55  )-----------( 225      ( 21 Oct 2024 00:30 )             26.2     10-21    137  |  105  |  9   ----------------------------<  90  3.7   |  21[L]  |  0.57    Ca    8.8      21 Oct 2024 00:30  Phos  2.9     10-21  Mg     2.00     10-21    TPro  7.7  /  Alb  4.4  /  TBili  <0.2  /  DBili  x   /  AST  19  /  ALT  18  /  AlkPhos  80  10-20    Lactate:    PT/INR - ( 20 Oct 2024 12:33 )   PT: 12.7 sec;   INR: 1.10 ratio         PTT - ( 20 Oct 2024 12:33 )  PTT:31.5 sec          Urinalysis Basic - ( 21 Oct 2024 00:30 )    Color: x / Appearance: x / SG: x / pH: x  Gluc: 90 mg/dL / Ketone: x  / Bili: x / Urobili: x   Blood: x / Protein: x / Nitrite: x   Leuk Esterase: x / RBC: x / WBC x   Sq Epi: x / Non Sq Epi: x / Bacteria: x        IMAGING:

## 2024-10-21 NOTE — CONSULT NOTE ADULT - ASSESSMENT
38 yo male with history of HLD, DM, PUD, KIN, and internal hemorrhoids admitted for anemia discovered on outpatient labs. Hgb 6.1 in ED, improved to 7.7 after 2u pRBC. Prior Hgb 9.6 1 year ago during admission for anemia during which workup revealed peptic ulcers (- for H. pylori) and internal hemorrhoids. Patient states he has had many weeks of bleeding after BMs, describing blood on the toilet paper after wiping or dripping into the bowl; on occasion has had blood "spurting" into the bowl or has had blood clots on the toilet tissue. BMs are otherwise benign, soft and brown, though admits to having to strain sometimes. States he was recommended to see a CRS for treatment of his hemorrhoids but did not follow through as he was nervous. Denies abdominal pain, CP, SOB, fevers, chills, N/V/D, melena. Last colonoscopy 10/2023 with internal hemorrhoids, otherwise negative; 10/2023 EGD with duodenal ulcers which resolved on repeat EGD 3/2024. 38 yo male with history of HLD, DM, PUD, KIN, and internal hemorrhoids admitted for anemia discovered on outpatient labs. Last colonoscopy 1 year ago (during admission for anemia) with internal hemorrhoids, otherwise negative; EGD at that time with PUD negative for H. pylori with resolution of ulceration on subsequent EGD. Patient admits to many weeks of bleeding on tissue/into toilet bowl after BMs, though never saw a colorectal surgeon for treatment of his hemorrhoids as recommended. AVSS, no active bleeding noted on KURT, abdominal exam benign. Hgb 6.1 in ED, improved to 7.7 after 2u pRBC.    Recs:  - No acute surgical intervention  - Monitor CBC  - GI recs appreciated; will f/u EGD tomorrow  - Bowel regimen  - Outpatient followup with Dr. Sethi for hemorrhoid banding  - Global care per primary    Discussed with Dr. Sethi    A Team Surgery  [id number] 38 yo male with history of HLD, DM, PUD, KIN, and internal hemorrhoids admitted for anemia discovered on outpatient labs. Last colonoscopy 1 year ago (during admission for anemia) with internal hemorrhoids, otherwise negative; EGD at that time with PUD negative for H. pylori with resolution of ulceration on subsequent EGD. Patient admits to many weeks of bleeding on tissue/into toilet bowl after BMs, though never saw a colorectal surgeon for treatment of his hemorrhoids as recommended. AVSS, no active bleeding noted on KURT, abdominal exam benign. Hgb 6.1 in ED, improved to 7.7 after 2u pRBC.    Recs:  - No acute surgical intervention  - Monitor CBC  - GI recs appreciated; will f/u EGD tomorrow  - Please also perform flex sig   - Bowel regimen  - Outpatient followup with Dr. Sethi for hemorrhoid banding  - Global care per primary    Discussed with Dr. Navdeep BLANTON Team Surgery  [id number]

## 2024-10-22 LAB
ANION GAP SERPL CALC-SCNC: 15 MMOL/L — HIGH (ref 7–14)
BLD GP AB SCN SERPL QL: NEGATIVE — SIGNIFICANT CHANGE UP
BUN SERPL-MCNC: 17 MG/DL — SIGNIFICANT CHANGE UP (ref 7–23)
CALCIUM SERPL-MCNC: 9.3 MG/DL — SIGNIFICANT CHANGE UP (ref 8.4–10.5)
CHLORIDE SERPL-SCNC: 102 MMOL/L — SIGNIFICANT CHANGE UP (ref 98–107)
CO2 SERPL-SCNC: 21 MMOL/L — LOW (ref 22–31)
CREAT SERPL-MCNC: 0.63 MG/DL — SIGNIFICANT CHANGE UP (ref 0.5–1.3)
EGFR: 125 ML/MIN/1.73M2 — SIGNIFICANT CHANGE UP
GLUCOSE BLDC GLUCOMTR-MCNC: 104 MG/DL — HIGH (ref 70–99)
GLUCOSE BLDC GLUCOMTR-MCNC: 109 MG/DL — HIGH (ref 70–99)
GLUCOSE BLDC GLUCOMTR-MCNC: 130 MG/DL — HIGH (ref 70–99)
GLUCOSE BLDC GLUCOMTR-MCNC: 139 MG/DL — HIGH (ref 70–99)
GLUCOSE BLDC GLUCOMTR-MCNC: 84 MG/DL — SIGNIFICANT CHANGE UP (ref 70–99)
GLUCOSE SERPL-MCNC: 90 MG/DL — SIGNIFICANT CHANGE UP (ref 70–99)
HCT VFR BLD CALC: 30.8 % — LOW (ref 39–50)
HCT VFR BLD CALC: 31.9 % — LOW (ref 39–50)
HGB BLD-MCNC: 9.2 G/DL — LOW (ref 13–17)
HGB BLD-MCNC: 9.5 G/DL — LOW (ref 13–17)
MCHC RBC-ENTMCNC: 20.4 PG — LOW (ref 27–34)
MCHC RBC-ENTMCNC: 20.4 PG — LOW (ref 27–34)
MCHC RBC-ENTMCNC: 29.8 GM/DL — LOW (ref 32–36)
MCHC RBC-ENTMCNC: 29.9 GM/DL — LOW (ref 32–36)
MCV RBC AUTO: 68.3 FL — LOW (ref 80–100)
MCV RBC AUTO: 68.6 FL — LOW (ref 80–100)
NRBC # BLD: 0 /100 WBCS — SIGNIFICANT CHANGE UP (ref 0–0)
NRBC # BLD: 0 /100 WBCS — SIGNIFICANT CHANGE UP (ref 0–0)
NRBC # FLD: 0 K/UL — SIGNIFICANT CHANGE UP (ref 0–0)
NRBC # FLD: 0 K/UL — SIGNIFICANT CHANGE UP (ref 0–0)
PLATELET # BLD AUTO: 250 K/UL — SIGNIFICANT CHANGE UP (ref 150–400)
PLATELET # BLD AUTO: 292 K/UL — SIGNIFICANT CHANGE UP (ref 150–400)
POTASSIUM SERPL-MCNC: 4.1 MMOL/L — SIGNIFICANT CHANGE UP (ref 3.5–5.3)
POTASSIUM SERPL-SCNC: 4.1 MMOL/L — SIGNIFICANT CHANGE UP (ref 3.5–5.3)
RBC # BLD: 4.51 M/UL — SIGNIFICANT CHANGE UP (ref 4.2–5.8)
RBC # BLD: 4.65 M/UL — SIGNIFICANT CHANGE UP (ref 4.2–5.8)
RBC # FLD: 21.8 % — HIGH (ref 10.3–14.5)
RBC # FLD: 21.8 % — HIGH (ref 10.3–14.5)
RH IG SCN BLD-IMP: POSITIVE — SIGNIFICANT CHANGE UP
SODIUM SERPL-SCNC: 138 MMOL/L — SIGNIFICANT CHANGE UP (ref 135–145)
WBC # BLD: 7.13 K/UL — SIGNIFICANT CHANGE UP (ref 3.8–10.5)
WBC # BLD: 8.53 K/UL — SIGNIFICANT CHANGE UP (ref 3.8–10.5)
WBC # FLD AUTO: 7.13 K/UL — SIGNIFICANT CHANGE UP (ref 3.8–10.5)
WBC # FLD AUTO: 8.53 K/UL — SIGNIFICANT CHANGE UP (ref 3.8–10.5)

## 2024-10-22 PROCEDURE — 99232 SBSQ HOSP IP/OBS MODERATE 35: CPT | Mod: GC

## 2024-10-22 RX ADMIN — PANTOPRAZOLE SODIUM 40 MILLIGRAM(S): 40 TABLET, DELAYED RELEASE ORAL at 18:07

## 2024-10-22 RX ADMIN — CHLORHEXIDINE GLUCONATE 1 APPLICATION(S): 40 SOLUTION TOPICAL at 11:32

## 2024-10-22 RX ADMIN — POLYETHYLENE GLYCOL 3350 17 GRAM(S): 17 POWDER, FOR SOLUTION ORAL at 11:32

## 2024-10-22 RX ADMIN — PANTOPRAZOLE SODIUM 40 MILLIGRAM(S): 40 TABLET, DELAYED RELEASE ORAL at 05:15

## 2024-10-22 RX ADMIN — Medication 10 MILLIGRAM(S): at 21:35

## 2024-10-22 RX ADMIN — MUPIROCIN 1 APPLICATION(S): 20 OINTMENT TOPICAL at 05:15

## 2024-10-22 RX ADMIN — Medication 2 TABLET(S): at 21:35

## 2024-10-22 RX ADMIN — MUPIROCIN 1 APPLICATION(S): 20 OINTMENT TOPICAL at 18:07

## 2024-10-22 NOTE — PROGRESS NOTE ADULT - ASSESSMENT
Patient is a 39 year old male with a past medical history of peptic ulcer disease (s/p endoscopy 10/2023), internal hemorrhoids, KIN, T2DM, and HLD presenting to the hospital for labs significant for anemia noted at his PCP visit on 10/18/24.    #iron deficiency anemia  *s/p 2u pRBC 6.1 --> 7.7  *Hg improved to 9.2 today   #Hx of PUD - DU's on EGD 10/2023 (EGD path neg for H. pylori) - no longer on PPI  #NSAID use iso sciatica pain  #Intermittent hematochezia  #Chronic constipation  #internal hemorrhoids  #NIDDM on Jardiance/Janumet - last dose of both 10/20 AM    DDx: PUD likely iso active NSAID use (prior EGD path neg for HP) vs related to int hemorrhoids vs less likely malig/colitis/small bowel source of bleeding      Recommendations:  -repeat CBC at least once today (last CBC from midnight 10/21)  -trend vitals, CBC, and monitor for clinical signs of bleeding  -maintain active type and screen  -transfusion goal to maintain hemoglobin >/= 7.0 and platelets >/= 50  -avoid NSAIDs  -PPI IV BID acceptable for now  -miralax qD + senna qHs   -plan for EGD/flex sigm 10/22  -please keep NPO   -hold Jardiance (last dose 10/20 AM)    Patient is a 39 year old male with a past medical history of peptic ulcer disease (s/p endoscopy 10/2023), internal hemorrhoids, KIN, T2DM, and HLD presenting to the hospital for labs significant for anemia noted at his PCP visit on 10/18/24.    #iron deficiency anemia  *s/p 2u pRBC 6.1 --> 7.7  *Hg improved to 9.2 today   #Hx of PUD - DU's on EGD 10/2023 (EGD path neg for H. pylori) - no longer on PPI  #NSAID use iso sciatica pain  #Intermittent hematochezia  #Chronic constipation  #internal hemorrhoids  #NIDDM on Jardiance/Janumet - last dose of both 10/20 AM    DDx: PUD likely iso active NSAID use (prior EGD path neg for HP) vs related to int hemorrhoids vs less likely malig/colitis/small bowel source of bleeding      Recommendations:  -plan for EGD/flex sigm 10/23 (pt on Jardiance; last dose 10/20 AM)  -okay for regular diet today  -repeat CBC at least once today (last CBC from midnight 10/21)  -trend vitals, CBC, and monitor for clinical signs of bleeding  -maintain active type and screen  -transfusion goal to maintain hemoglobin >/= 7.0 and platelets >/= 50  -avoid NSAIDs  -PPI IV BID acceptable for now  -miralax qD + senna qHs  Patient is a 39 year old male with a past medical history of peptic ulcer disease (s/p endoscopy 10/2023), internal hemorrhoids, KIN, T2DM, and HLD presenting to the hospital for labs significant for anemia noted at his PCP visit on 10/18/24.    #iron deficiency anemia  *s/p 2u pRBC 6.1 --> 7.7  *Hg improved to 9.2 today   #Hx of PUD - DU's on EGD 10/2023 (EGD path neg for H. pylori) - no longer on PPI  #NSAID use iso sciatica pain  #Intermittent hematochezia  #Chronic constipation  #internal hemorrhoids  #NIDDM on Jardiance/Janumet - last dose of both 10/20 AM    DDx: PUD likely iso active NSAID use (prior EGD path neg for HP) vs related to int hemorrhoids vs less likely malig/colitis/small bowel source of bleeding      Recommendations:  -plan for EGD/flex sigm 10/23 (pt on Jardiance; last dose 10/20 AM)  -okay for regular diet today; NPO after midnight  -water enemas x2 tomorrow AM  -repeat CBC at least once today (last CBC from midnight 10/21)  -trend vitals, CBC, and monitor for clinical signs of bleeding  -maintain active type and screen  -transfusion goal to maintain hemoglobin >/= 7.0 and platelets >/= 50  -avoid NSAIDs  -PPI IV BID acceptable for now  -miralax qD + senna qHs

## 2024-10-22 NOTE — PROGRESS NOTE ADULT - ASSESSMENT
Assessment: 38 yo male with history of HLD, DM, PUD, KIN, and internal hemorrhoids admitted for anemia discovered on outpatient labs. Last colonoscopy 1 year ago (during admission for anemia) with internal hemorrhoids, otherwise negative; EGD at that time with PUD negative for H. pylori with resolution of ulceration on subsequent EGD. Patient admits to many weeks of bleeding on tissue/into toilet bowl after BMs, though never saw a colorectal surgeon for treatment of his hemorrhoids as recommended. AVSS, no active bleeding noted on KURT, abdominal exam benign. Hgb 6.1 in ED, improved to 7.7 after 2u pRBC.    Plan:  - GI planning for EGD/flex sig on Thursday due to hx of SGLT2 inhibitors  - Monitor I/Os, for bloody bowel movements  - Monitor Hgb, hemodynamics  - Bowel regimen- on senna and miralax  - Remainder of care per primary team     A Team Surgery  [id number]

## 2024-10-23 LAB
ALBUMIN SERPL ELPH-MCNC: 4.5 G/DL — SIGNIFICANT CHANGE UP (ref 3.3–5)
ALP SERPL-CCNC: 77 U/L — SIGNIFICANT CHANGE UP (ref 40–120)
ALT FLD-CCNC: 19 U/L — SIGNIFICANT CHANGE UP (ref 4–41)
ANION GAP SERPL CALC-SCNC: 16 MMOL/L — HIGH (ref 7–14)
APTT BLD: 32.2 SEC — SIGNIFICANT CHANGE UP (ref 24.5–35.6)
AST SERPL-CCNC: 18 U/L — SIGNIFICANT CHANGE UP (ref 4–40)
BILIRUB DIRECT SERPL-MCNC: <0.2 MG/DL — SIGNIFICANT CHANGE UP (ref 0–0.3)
BILIRUB INDIRECT FLD-MCNC: >0.4 MG/DL — SIGNIFICANT CHANGE UP (ref 0–1)
BILIRUB SERPL-MCNC: 0.6 MG/DL — SIGNIFICANT CHANGE UP (ref 0.2–1.2)
BUN SERPL-MCNC: 17 MG/DL — SIGNIFICANT CHANGE UP (ref 7–23)
CALCIUM SERPL-MCNC: 9.5 MG/DL — SIGNIFICANT CHANGE UP (ref 8.4–10.5)
CHLORIDE SERPL-SCNC: 100 MMOL/L — SIGNIFICANT CHANGE UP (ref 98–107)
CO2 SERPL-SCNC: 21 MMOL/L — LOW (ref 22–31)
CREAT SERPL-MCNC: 0.56 MG/DL — SIGNIFICANT CHANGE UP (ref 0.5–1.3)
EGFR: 129 ML/MIN/1.73M2 — SIGNIFICANT CHANGE UP
GLUCOSE BLDC GLUCOMTR-MCNC: 102 MG/DL — HIGH (ref 70–99)
GLUCOSE BLDC GLUCOMTR-MCNC: 108 MG/DL — HIGH (ref 70–99)
GLUCOSE BLDC GLUCOMTR-MCNC: 119 MG/DL — HIGH (ref 70–99)
GLUCOSE BLDC GLUCOMTR-MCNC: 140 MG/DL — HIGH (ref 70–99)
GLUCOSE BLDC GLUCOMTR-MCNC: 211 MG/DL — HIGH (ref 70–99)
GLUCOSE SERPL-MCNC: 92 MG/DL — SIGNIFICANT CHANGE UP (ref 70–99)
HCT VFR BLD CALC: 31.9 % — LOW (ref 39–50)
HGB BLD-MCNC: 9.3 G/DL — LOW (ref 13–17)
INR BLD: 1.1 RATIO — SIGNIFICANT CHANGE UP (ref 0.85–1.16)
MAGNESIUM SERPL-MCNC: 2.1 MG/DL — SIGNIFICANT CHANGE UP (ref 1.6–2.6)
MCHC RBC-ENTMCNC: 19.9 PG — LOW (ref 27–34)
MCHC RBC-ENTMCNC: 29.2 GM/DL — LOW (ref 32–36)
MCV RBC AUTO: 68.2 FL — LOW (ref 80–100)
NRBC # BLD: 0 /100 WBCS — SIGNIFICANT CHANGE UP (ref 0–0)
NRBC # FLD: 0 K/UL — SIGNIFICANT CHANGE UP (ref 0–0)
PHOSPHATE SERPL-MCNC: 3.2 MG/DL — SIGNIFICANT CHANGE UP (ref 2.5–4.5)
PLATELET # BLD AUTO: 265 K/UL — SIGNIFICANT CHANGE UP (ref 150–400)
POTASSIUM SERPL-MCNC: 4 MMOL/L — SIGNIFICANT CHANGE UP (ref 3.5–5.3)
POTASSIUM SERPL-SCNC: 4 MMOL/L — SIGNIFICANT CHANGE UP (ref 3.5–5.3)
PROT SERPL-MCNC: 8.4 G/DL — HIGH (ref 6–8.3)
PROTHROM AB SERPL-ACNC: 13.1 SEC — SIGNIFICANT CHANGE UP (ref 9.9–13.4)
RBC # BLD: 4.68 M/UL — SIGNIFICANT CHANGE UP (ref 4.2–5.8)
RBC # FLD: 22.2 % — HIGH (ref 10.3–14.5)
SODIUM SERPL-SCNC: 137 MMOL/L — SIGNIFICANT CHANGE UP (ref 135–145)
WBC # BLD: 6.73 K/UL — SIGNIFICANT CHANGE UP (ref 3.8–10.5)
WBC # FLD AUTO: 6.73 K/UL — SIGNIFICANT CHANGE UP (ref 3.8–10.5)

## 2024-10-23 PROCEDURE — 43239 EGD BIOPSY SINGLE/MULTIPLE: CPT

## 2024-10-23 PROCEDURE — 45350 SGMDSC W/BAND LIGATION: CPT

## 2024-10-23 PROCEDURE — 88305 TISSUE EXAM BY PATHOLOGIST: CPT | Mod: 26

## 2024-10-23 DEVICE — SPEEDBAND SPRVW 7: Type: IMPLANTABLE DEVICE | Status: FUNCTIONAL

## 2024-10-23 RX ORDER — PANTOPRAZOLE SODIUM 40 MG/1
40 TABLET, DELAYED RELEASE ORAL
Refills: 0 | Status: DISCONTINUED | OUTPATIENT
Start: 2024-10-23 | End: 2024-10-24

## 2024-10-23 RX ADMIN — MUPIROCIN 1 APPLICATION(S): 20 OINTMENT TOPICAL at 17:57

## 2024-10-23 RX ADMIN — MUPIROCIN 1 APPLICATION(S): 20 OINTMENT TOPICAL at 05:15

## 2024-10-23 RX ADMIN — Medication 2 TABLET(S): at 21:43

## 2024-10-23 RX ADMIN — POLYETHYLENE GLYCOL 3350 17 GRAM(S): 17 POWDER, FOR SOLUTION ORAL at 17:55

## 2024-10-23 RX ADMIN — PANTOPRAZOLE SODIUM 40 MILLIGRAM(S): 40 TABLET, DELAYED RELEASE ORAL at 05:15

## 2024-10-23 RX ADMIN — Medication 10 MILLIGRAM(S): at 21:44

## 2024-10-23 RX ADMIN — CHLORHEXIDINE GLUCONATE 1 APPLICATION(S): 40 SOLUTION TOPICAL at 17:56

## 2024-10-23 NOTE — PROGRESS NOTE ADULT - ASSESSMENT
Assessment: 39yr male with history of HLD, DM, PUD, KIN, and internal hemorrhoids admitted for anemia discovered on outpatient labs. Last colonoscopy 1 year ago (during admission for anemia) with internal hemorrhoids, otherwise negative; EGD at that time with PUD negative for H. pylori with resolution of ulceration on subsequent EGD. Patient admits to many weeks of bleeding on tissue/into toilet bowl after BMs, though never saw a colorectal surgeon for treatment of his hemorrhoids as recommended. AVSS, no active bleeding noted on KURT, abdominal exam benign. Hgb 6.1 in ED, improved to 7.7 after 2u pRBC.    Plan:  - EGD/Flex sig today with GI to assess source of GIB  - Monitor I/Os, for bloody bowel movements  - Hgb remains stable 9.2 -> 9.5 -> 9.3  - Bowel regimen- on senna and miralax  - Remainder of care per primary team     A Team Surgery  [id number]

## 2024-10-24 VITALS — TEMPERATURE: 98 F

## 2024-10-24 LAB
ALBUMIN SERPL ELPH-MCNC: 4.3 G/DL — SIGNIFICANT CHANGE UP (ref 3.3–5)
ALP SERPL-CCNC: 65 U/L — SIGNIFICANT CHANGE UP (ref 40–120)
ALT FLD-CCNC: 14 U/L — SIGNIFICANT CHANGE UP (ref 4–41)
ANION GAP SERPL CALC-SCNC: 13 MMOL/L — SIGNIFICANT CHANGE UP (ref 7–14)
ANISOCYTOSIS BLD QL: SLIGHT — SIGNIFICANT CHANGE UP
AST SERPL-CCNC: 14 U/L — SIGNIFICANT CHANGE UP (ref 4–40)
BASOPHILS # BLD AUTO: 0.06 K/UL — SIGNIFICANT CHANGE UP (ref 0–0.2)
BASOPHILS NFR BLD AUTO: 0.9 % — SIGNIFICANT CHANGE UP (ref 0–2)
BILIRUB SERPL-MCNC: 0.4 MG/DL — SIGNIFICANT CHANGE UP (ref 0.2–1.2)
BUN SERPL-MCNC: 15 MG/DL — SIGNIFICANT CHANGE UP (ref 7–23)
CALCIUM SERPL-MCNC: 9.1 MG/DL — SIGNIFICANT CHANGE UP (ref 8.4–10.5)
CHLORIDE SERPL-SCNC: 100 MMOL/L — SIGNIFICANT CHANGE UP (ref 98–107)
CO2 SERPL-SCNC: 23 MMOL/L — SIGNIFICANT CHANGE UP (ref 22–31)
CREAT SERPL-MCNC: 0.57 MG/DL — SIGNIFICANT CHANGE UP (ref 0.5–1.3)
DACRYOCYTES BLD QL SMEAR: SLIGHT — SIGNIFICANT CHANGE UP
EGFR: 129 ML/MIN/1.73M2 — SIGNIFICANT CHANGE UP
ELLIPTOCYTES BLD QL SMEAR: SIGNIFICANT CHANGE UP
EOSINOPHIL # BLD AUTO: 0.17 K/UL — SIGNIFICANT CHANGE UP (ref 0–0.5)
EOSINOPHIL NFR BLD AUTO: 2.6 % — SIGNIFICANT CHANGE UP (ref 0–6)
GIANT PLATELETS BLD QL SMEAR: PRESENT — SIGNIFICANT CHANGE UP
GLUCOSE BLDC GLUCOMTR-MCNC: 128 MG/DL — HIGH (ref 70–99)
GLUCOSE BLDC GLUCOMTR-MCNC: 152 MG/DL — HIGH (ref 70–99)
GLUCOSE SERPL-MCNC: 125 MG/DL — HIGH (ref 70–99)
HCT VFR BLD CALC: 28.3 % — LOW (ref 39–50)
HGB BLD-MCNC: 8.3 G/DL — LOW (ref 13–17)
HYPOCHROMIA BLD QL: SLIGHT — SIGNIFICANT CHANGE UP
IANC: 3.9 K/UL — SIGNIFICANT CHANGE UP (ref 1.8–7.4)
LYMPHOCYTES # BLD AUTO: 1.8 K/UL — SIGNIFICANT CHANGE UP (ref 1–3.3)
LYMPHOCYTES # BLD AUTO: 27 % — SIGNIFICANT CHANGE UP (ref 13–44)
MAGNESIUM SERPL-MCNC: 1.9 MG/DL — SIGNIFICANT CHANGE UP (ref 1.6–2.6)
MANUAL SMEAR VERIFICATION: SIGNIFICANT CHANGE UP
MCHC RBC-ENTMCNC: 20 PG — LOW (ref 27–34)
MCHC RBC-ENTMCNC: 29.3 GM/DL — LOW (ref 32–36)
MCV RBC AUTO: 68 FL — LOW (ref 80–100)
MICROCYTES BLD QL: SLIGHT — SIGNIFICANT CHANGE UP
MONOCYTES # BLD AUTO: 0.52 K/UL — SIGNIFICANT CHANGE UP (ref 0–0.9)
MONOCYTES NFR BLD AUTO: 7.8 % — SIGNIFICANT CHANGE UP (ref 2–14)
NEUTROPHILS # BLD AUTO: 3.94 K/UL — SIGNIFICANT CHANGE UP (ref 1.8–7.4)
NEUTROPHILS NFR BLD AUTO: 59.1 % — SIGNIFICANT CHANGE UP (ref 43–77)
PHOSPHATE SERPL-MCNC: 3 MG/DL — SIGNIFICANT CHANGE UP (ref 2.5–4.5)
PLAT MORPH BLD: NORMAL — SIGNIFICANT CHANGE UP
PLATELET # BLD AUTO: 255 K/UL — SIGNIFICANT CHANGE UP (ref 150–400)
PLATELET COUNT - ESTIMATE: NORMAL — SIGNIFICANT CHANGE UP
POIKILOCYTOSIS BLD QL AUTO: SIGNIFICANT CHANGE UP
POLYCHROMASIA BLD QL SMEAR: SIGNIFICANT CHANGE UP
POTASSIUM SERPL-MCNC: 4 MMOL/L — SIGNIFICANT CHANGE UP (ref 3.5–5.3)
POTASSIUM SERPL-SCNC: 4 MMOL/L — SIGNIFICANT CHANGE UP (ref 3.5–5.3)
PROT SERPL-MCNC: 7.4 G/DL — SIGNIFICANT CHANGE UP (ref 6–8.3)
RBC # BLD: 4.16 M/UL — LOW (ref 4.2–5.8)
RBC # FLD: 21.3 % — HIGH (ref 10.3–14.5)
RBC BLD AUTO: ABNORMAL
SODIUM SERPL-SCNC: 136 MMOL/L — SIGNIFICANT CHANGE UP (ref 135–145)
TARGETS BLD QL SMEAR: SLIGHT — SIGNIFICANT CHANGE UP
VARIANT LYMPHS # BLD: 2.6 % — SIGNIFICANT CHANGE UP (ref 0–6)
WBC # BLD: 6.67 K/UL — SIGNIFICANT CHANGE UP (ref 3.8–10.5)
WBC # FLD AUTO: 6.67 K/UL — SIGNIFICANT CHANGE UP (ref 3.8–10.5)

## 2024-10-24 RX ORDER — SENNA 187 MG
2 TABLET ORAL
Qty: 0 | Refills: 0 | DISCHARGE
Start: 2024-10-24

## 2024-10-24 RX ORDER — ASCORBIC ACID 500 MG
1 TABLET ORAL
Qty: 30 | Refills: 0
Start: 2024-10-24 | End: 2024-11-22

## 2024-10-24 RX ORDER — POLYETHYLENE GLYCOL 3350 17 G/17G
17 POWDER, FOR SOLUTION ORAL
Qty: 0 | Refills: 0 | DISCHARGE
Start: 2024-10-24

## 2024-10-24 RX ORDER — FERROUS SULFATE 325(65) MG
1 TABLET ORAL
Qty: 0 | Refills: 0 | DISCHARGE
Start: 2024-10-24

## 2024-10-24 RX ORDER — IRON SUCROSE 20 MG/ML
200 INJECTION, SOLUTION INTRAVENOUS ONCE
Refills: 0 | Status: COMPLETED | OUTPATIENT
Start: 2024-10-24 | End: 2024-10-24

## 2024-10-24 RX ORDER — FERROUS SULFATE 325(65) MG
325 TABLET ORAL DAILY
Refills: 0 | Status: DISCONTINUED | OUTPATIENT
Start: 2024-10-24 | End: 2024-10-24

## 2024-10-24 RX ADMIN — IRON SUCROSE 200 MILLIGRAM(S): 20 INJECTION, SOLUTION INTRAVENOUS at 12:49

## 2024-10-24 RX ADMIN — PANTOPRAZOLE SODIUM 40 MILLIGRAM(S): 40 TABLET, DELAYED RELEASE ORAL at 06:40

## 2024-10-24 RX ADMIN — Medication 325 MILLIGRAM(S): at 12:49

## 2024-10-24 RX ADMIN — POLYETHYLENE GLYCOL 3350 17 GRAM(S): 17 POWDER, FOR SOLUTION ORAL at 12:50

## 2024-10-24 RX ADMIN — MUPIROCIN 1 APPLICATION(S): 20 OINTMENT TOPICAL at 07:15

## 2024-10-24 NOTE — PROGRESS NOTE ADULT - ASSESSMENT
39yr male with history of HLD, DM, PUD, KIN, and internal hemorrhoids admitted for anemia discovered on outpatient labs. Last colonoscopy 1 year ago (during admission for anemia) with internal hemorrhoids, otherwise negative; EGD at that time with PUD negative for H. pylori with resolution of ulceration on subsequent EGD. Patient admits to many weeks of bleeding on tissue/into toilet bowl after BMs, though never saw a colorectal surgeon for treatment of his hemorrhoids as recommended. AVSS, no active bleeding noted on KURT, abdominal exam benign. Hgb 6.1 in ED, improved to 7.7 after 2u pRBC.    Recommendations:   - S/p EGD/flex sig yesterday w/ large internal hemorrhoids, stigmata of recent bleeding, s/p RBL x2      - No further evidence of bleeding post-procedure   - Continue bowel regimen  - Patient may f/u with Dr. Sethi outpatient for further evaluation if continued bleeding  - Global care per primary   - Please call back with questions or concerns         [facility] Team Surgery  [id number]

## 2024-10-24 NOTE — PROGRESS NOTE ADULT - PROBLEM SELECTOR PLAN 3
EGD tomorrow   NPO post mn
EGD tomorrow   NPO post mn
EGD pending
ruled OUT after admission   EGD negative

## 2024-10-24 NOTE — PROGRESS NOTE ADULT - PROBLEM SELECTOR PLAN 6
low risk
low risk
DVT ppx: low risk  Diet: CLD with consistent carbs  Dispo: pending workup
DVT ppx: low risk  Diet: CLD with consistent carbs  Dispo: pending workup

## 2024-10-24 NOTE — PROGRESS NOTE ADULT - SUBJECTIVE AND OBJECTIVE BOX
A TEAM SURGERY DAILY PROGRESS NOTE    SUBJECTIVE:   Overnight: no acute events   Patient seen and evaluated on AM rounds. S/p EGD and flex sig yesterday, feeling well. No further bloody BM       OBJECTIVE:  Vital Signs Last 24 Hrs  T(C): 36.9 (24 Oct 2024 10:00), Max: 37.5 (24 Oct 2024 09:58)  T(F): 98.5 (24 Oct 2024 10:00), Max: 99.5 (24 Oct 2024 09:58)  HR: 90 (24 Oct 2024 09:58) (74 - 101)  BP: 137/85 (24 Oct 2024 09:58) (110/66 - 137/85)  RR: 18 (24 Oct 2024 09:58) (16 - 20)  SpO2: 100% (24 Oct 2024 09:58) (99% - 100%)    Parameters below as of 24 Oct 2024 06:41  Patient On (Oxygen Delivery Method): room air    STANDING  bisacodyl 5 milliGRAM(s) Oral every 12 hours  chlorhexidine 2% Cloths 1 Application(s) Topical daily  dextrose 5%. 1000 milliLiter(s) (50 mL/Hr) IV Continuous <Continuous>  dextrose 5%. 1000 milliLiter(s) (100 mL/Hr) IV Continuous <Continuous>  dextrose 50% Injectable 25 Gram(s) IV Push once  dextrose 50% Injectable 25 Gram(s) IV Push once  dextrose 50% Injectable 12.5 Gram(s) IV Push once  ferrous    sulfate 325 milliGRAM(s) Oral daily  glucagon  Injectable 1 milliGRAM(s) IntraMuscular once  influenza   Vaccine 0.5 milliLiter(s) IntraMuscular once  insulin lispro (ADMELOG) corrective regimen sliding scale   SubCutaneous three times a day before meals  insulin lispro (ADMELOG) corrective regimen sliding scale   SubCutaneous at bedtime  iron sucrose Injectable 200 milliGRAM(s) IV Push once  mupirocin 2% Ointment 1 Application(s) Topical two times a day  pantoprazole    Tablet 40 milliGRAM(s) Oral before breakfast  polyethylene glycol 3350 17 Gram(s) Oral daily  rosuvastatin 10 milliGRAM(s) Oral at bedtime  senna 2 Tablet(s) Oral at bedtime    PRN  acetaminophen     Tablet .. 650 milliGRAM(s) Oral every 6 hours PRN Temp greater or equal to 38C (100.4F), Mild Pain (1 - 3)  aluminum hydroxide/magnesium hydroxide/simethicone Suspension 30 milliLiter(s) Oral every 4 hours PRN Dyspepsia  dextrose Oral Gel 15 Gram(s) Oral once PRN Blood Glucose LESS THAN 70 milliGRAM(s)/deciliter  melatonin 3 milliGRAM(s) Oral at bedtime PRN Insomnia  ondansetron Injectable 4 milliGRAM(s) IV Push every 8 hours PRN Nausea and/or Vomiting      Labs:  136  |  100  |  15  ----------------------------<  125[H]    (10-24)  4.0   |  23  |  0.57          Ca    9.1      10-24  Mg    1.90  Phos  3.0            Urinalysis Basic - ( 24 Oct 2024 06:20 )    Color: x / Appearance: x / SG: x / pH: x  Gluc: 125 mg/dL / Ketone: x  / Bili: x / Urobili: x   Blood: x / Protein: x / Nitrite: x   Leuk Esterase: x / RBC: x / WBC x   Sq Epi: x / Non Sq Epi: x / Bacteria: x      Urinalysis Basic - ( 24 Oct 2024 06:20 )    Color: x / Appearance: x / SG: x / pH: x  Gluc: 125 mg/dL / Ketone: x  / Bili: x / Urobili: x   Blood: x / Protein: x / Nitrite: x   Leuk Esterase: x / RBC: x / WBC x   Sq Epi: x / Non Sq Epi: x / Bacteria: x      Physical Exam:  General: NAD  Cardiovascular: appears well perfused   Respiratory: respirations non labored  Gastrointestinal: soft, nontender, nondistended  Extremities: FROM, warm  Neurological: A+Ox3   
Patient is a 39y old  Male who presents with a chief complaint of Anemia (24 Oct 2024 07:27)      Interval Events:   No further hematochezia. Patient feeling otherwise well.     ROS:   A 12-point ROS was performed and negative except as noted in HPI.    Hospital Medications:  acetaminophen     Tablet .. 650 milliGRAM(s) Oral every 6 hours PRN  aluminum hydroxide/magnesium hydroxide/simethicone Suspension 30 milliLiter(s) Oral every 4 hours PRN  chlorhexidine 2% Cloths 1 Application(s) Topical daily  dextrose 5%. 1000 milliLiter(s) IV Continuous <Continuous>  dextrose 5%. 1000 milliLiter(s) IV Continuous <Continuous>  dextrose 50% Injectable 25 Gram(s) IV Push once  dextrose 50% Injectable 12.5 Gram(s) IV Push once  dextrose 50% Injectable 25 Gram(s) IV Push once  dextrose Oral Gel 15 Gram(s) Oral once PRN  glucagon  Injectable 1 milliGRAM(s) IntraMuscular once  influenza   Vaccine 0.5 milliLiter(s) IntraMuscular once  insulin lispro (ADMELOG) corrective regimen sliding scale   SubCutaneous three times a day before meals  insulin lispro (ADMELOG) corrective regimen sliding scale   SubCutaneous at bedtime  melatonin 3 milliGRAM(s) Oral at bedtime PRN  mupirocin 2% Ointment 1 Application(s) Topical two times a day  ondansetron Injectable 4 milliGRAM(s) IV Push every 8 hours PRN  pantoprazole    Tablet 40 milliGRAM(s) Oral before breakfast  polyethylene glycol 3350 17 Gram(s) Oral daily  rosuvastatin 10 milliGRAM(s) Oral at bedtime  senna 2 Tablet(s) Oral at bedtime      PHYSICAL EXAM:   Vital Signs:  Vital Signs Last 24 Hrs  T(C): 37.5 (24 Oct 2024 09:58), Max: 37.5 (24 Oct 2024 09:58)  T(F): 99.5 (24 Oct 2024 09:58), Max: 99.5 (24 Oct 2024 09:58)  HR: 90 (24 Oct 2024 09:58) (74 - 101)  BP: 137/85 (24 Oct 2024 09:58) (110/66 - 137/85)  BP(mean): --  RR: 18 (24 Oct 2024 09:58) (16 - 20)  SpO2: 100% (24 Oct 2024 09:58) (99% - 100%)    Parameters below as of 24 Oct 2024 06:41  Patient On (Oxygen Delivery Method): room air      Daily Height in cm: 177.8 (23 Oct 2024 12:37)    Daily     GENERAL: no acute distress  NEURO: alert  HEENT: anicteric sclera, no conjunctival pallor appreciated  CHEST: no respiratory distress, no accessory muscle use  CARDIAC: regular rate  ABDOMEN: soft, nondistended, nontender, no rebound or guarding  EXTREMITIES: warm, well perfused, no edema  SKIN: no lesions noted    LABS: reviewed                        8.3    6.67  )-----------( 255      ( 24 Oct 2024 06:20 )             28.3     10-24    136  |  100  |  15  ----------------------------<  125[H]  4.0   |  23  |  0.57    Ca    9.1      24 Oct 2024 06:20  Phos  3.0     10-24  Mg     1.90     10-24    TPro  7.4  /  Alb  4.3  /  TBili  0.4  /  DBili  x   /  AST  14  /  ALT  14  /  AlkPhos  65  10-24    LIVER FUNCTIONS - ( 24 Oct 2024 06:20 )  Alb: 4.3 g/dL / Pro: 7.4 g/dL / ALK PHOS: 65 U/L / ALT: 14 U/L / AST: 14 U/L / GGT: x             Interval Diagnostic Studies: see sunrise for full report  
SURGERY DAILY PROGRESS NOTE    SUBJECTIVE: Overnight patient received prep enemas for flex sig today. Reports bloody BM x2 s/p enemas. Patient seen and evaluated on AM rounds. Denies abdominal pain, N/V. Tolerated clears yesterday.    OBJECTIVE:  Vital Signs Last 24 Hrs  T(C): 36.8 (23 Oct 2024 05:19), Max: 37 (22 Oct 2024 10:28)  T(F): 98.2 (23 Oct 2024 05:19), Max: 98.6 (22 Oct 2024 10:28)  HR: 89 (23 Oct 2024 05:19) (83 - 99)  BP: 117/74 (23 Oct 2024 05:19) (117/74 - 133/86)  BP(mean): --  RR: 18 (23 Oct 2024 05:19) (18 - 18)  SpO2: 100% (23 Oct 2024 05:19) (99% - 100%)    Parameters below as of 23 Oct 2024 05:19  Patient On (Oxygen Delivery Method): room air    I&O's Detail    22 Oct 2024 07:01  -  23 Oct 2024 07:00  --------------------------------------------------------  IN:    Oral Fluid: 950 mL  Total IN: 950 mL    OUT:    Voided (mL): 1050 mL  Total OUT: 1050 mL    Total NET: -100 mL    Daily     Daily Weight in k.8 (23 Oct 2024 01:39)  MEDICATIONS  (STANDING):  chlorhexidine 2% Cloths 1 Application(s) Topical daily  dextrose 5%. 1000 milliLiter(s) (100 mL/Hr) IV Continuous <Continuous>  dextrose 5%. 1000 milliLiter(s) (50 mL/Hr) IV Continuous <Continuous>  dextrose 50% Injectable 25 Gram(s) IV Push once  dextrose 50% Injectable 12.5 Gram(s) IV Push once  dextrose 50% Injectable 25 Gram(s) IV Push once  glucagon  Injectable 1 milliGRAM(s) IntraMuscular once  influenza   Vaccine 0.5 milliLiter(s) IntraMuscular once  insulin lispro (ADMELOG) corrective regimen sliding scale   SubCutaneous three times a day before meals  insulin lispro (ADMELOG) corrective regimen sliding scale   SubCutaneous at bedtime  mupirocin 2% Ointment 1 Application(s) Topical two times a day  pantoprazole  Injectable 40 milliGRAM(s) IV Push two times a day  polyethylene glycol 3350 17 Gram(s) Oral daily  rosuvastatin 10 milliGRAM(s) Oral at bedtime  senna 2 Tablet(s) Oral at bedtime    MEDICATIONS  (PRN):  acetaminophen     Tablet .. 650 milliGRAM(s) Oral every 6 hours PRN Temp greater or equal to 38C (100.4F), Mild Pain (1 - 3)  aluminum hydroxide/magnesium hydroxide/simethicone Suspension 30 milliLiter(s) Oral every 4 hours PRN Dyspepsia  dextrose Oral Gel 15 Gram(s) Oral once PRN Blood Glucose LESS THAN 70 milliGRAM(s)/deciliter  melatonin 3 milliGRAM(s) Oral at bedtime PRN Insomnia  ondansetron Injectable 4 milliGRAM(s) IV Push every 8 hours PRN Nausea and/or Vomiting    LABS:                        9.5    8.53  )-----------( 292      ( 22 Oct 2024 21:15 )             31.9     10-22    138  |  102  |  17  ----------------------------<  90  4.1   |  21[L]  |  0.63    Ca    9.3      22 Oct 2024 05:54      PT/INR - ( 23 Oct 2024 05:35 )   PT: 13.1 sec;   INR: 1.10 ratio         PTT - ( 23 Oct 2024 05:35 )  PTT:32.2 sec  Urinalysis Basic - ( 22 Oct 2024 05:54 )    Color: x / Appearance: x / SG: x / pH: x  Gluc: 90 mg/dL / Ketone: x  / Bili: x / Urobili: x   Blood: x / Protein: x / Nitrite: x   Leuk Esterase: x / RBC: x / WBC x   Sq Epi: x / Non Sq Epi: x / Bacteria: x    PHYSICAL EXAM:  Constitutional: Well developed, well nourished, NAD  Pulmonary: Symmetric chest rise bilaterally, no increased WOB, on room air  Gastrointestinal: Abdomen soft, nontender, nondistended  Extremities: Warm, well perfused    
SURGERY DAILY PROGRESS NOTE    SUBJECTIVE: Patient seen and evaluated on AM rounds. Reports one bowel movement overnight with associated dark red blood. Denies abdominal pain. Reports improvement in fatigue, dizziness since blood transfusions yesterday. Remains hemodynamically stable.    OBJECTIVE:  Vital Signs Last 24 Hrs  T(C): 36.5 (22 Oct 2024 05:14), Max: 37.1 (21 Oct 2024 21:06)  T(F): 97.7 (22 Oct 2024 05:14), Max: 98.7 (21 Oct 2024 21:06)  HR: 78 (22 Oct 2024 05:14) (78 - 94)  BP: 115/78 (22 Oct 2024 05:14) (112/72 - 115/78)  BP(mean): --  RR: 18 (22 Oct 2024 05:14) (17 - 18)  SpO2: 100% (22 Oct 2024 05:14) (99% - 100%)    Parameters below as of 22 Oct 2024 05:14  Patient On (Oxygen Delivery Method): room air    I&O's Detail    21 Oct 2024 07:01  -  22 Oct 2024 07:00  --------------------------------------------------------  IN:    Oral Fluid: 940 mL  Total IN: 940 mL    OUT:    Blood Loss (mL): 0 mL    Voided (mL): 700 mL  Total OUT: 700 mL    Total NET: 240 mL    Daily   MEDICATIONS  (STANDING):  chlorhexidine 2% Cloths 1 Application(s) Topical daily  dextrose 5%. 1000 milliLiter(s) (50 mL/Hr) IV Continuous <Continuous>  dextrose 5%. 1000 milliLiter(s) (100 mL/Hr) IV Continuous <Continuous>  dextrose 50% Injectable 25 Gram(s) IV Push once  dextrose 50% Injectable 12.5 Gram(s) IV Push once  dextrose 50% Injectable 25 Gram(s) IV Push once  glucagon  Injectable 1 milliGRAM(s) IntraMuscular once  influenza   Vaccine 0.5 milliLiter(s) IntraMuscular once  insulin lispro (ADMELOG) corrective regimen sliding scale   SubCutaneous at bedtime  insulin lispro (ADMELOG) corrective regimen sliding scale   SubCutaneous three times a day before meals  mupirocin 2% Ointment 1 Application(s) Topical two times a day  pantoprazole  Injectable 40 milliGRAM(s) IV Push two times a day  polyethylene glycol 3350 17 Gram(s) Oral daily  rosuvastatin 10 milliGRAM(s) Oral at bedtime  senna 2 Tablet(s) Oral at bedtime    MEDICATIONS  (PRN):  acetaminophen     Tablet .. 650 milliGRAM(s) Oral every 6 hours PRN Temp greater or equal to 38C (100.4F), Mild Pain (1 - 3)  aluminum hydroxide/magnesium hydroxide/simethicone Suspension 30 milliLiter(s) Oral every 4 hours PRN Dyspepsia  dextrose Oral Gel 15 Gram(s) Oral once PRN Blood Glucose LESS THAN 70 milliGRAM(s)/deciliter  melatonin 3 milliGRAM(s) Oral at bedtime PRN Insomnia  ondansetron Injectable 4 milliGRAM(s) IV Push every 8 hours PRN Nausea and/or Vomiting    LABS:                        9.2    7.13  )-----------( 250      ( 22 Oct 2024 05:54 )             30.8     10-22    138  |  102  |  17  ----------------------------<  90  4.1   |  21[L]  |  0.63    Ca    9.3      22 Oct 2024 05:54  Phos  2.9     10-21  Mg     2.00     10-21    TPro  7.7  /  Alb  4.4  /  TBili  <0.2  /  DBili  x   /  AST  19  /  ALT  18  /  AlkPhos  80  10-20    PT/INR - ( 20 Oct 2024 12:33 )   PT: 12.7 sec;   INR: 1.10 ratio       PTT - ( 20 Oct 2024 12:33 )  PTT:31.5 sec  Urinalysis Basic - ( 22 Oct 2024 05:54 )    Color: x / Appearance: x / SG: x / pH: x  Gluc: 90 mg/dL / Ketone: x  / Bili: x / Urobili: x   Blood: x / Protein: x / Nitrite: x   Leuk Esterase: x / RBC: x / WBC x   Sq Epi: x / Non Sq Epi: x / Bacteria: x    PHYSICAL EXAM:  Constitutional: Well developed, well nourished, NAD  Pulmonary: Symmetric chest rise bilaterally, no increased WOB, on room air  Gastrointestinal: Abdomen soft, nontender, nondistended  Rectal: External hemorrhoids- non-tender, no bleeding, no blood on KURT, brown stool  Extremities: Warm, well perfused  
*******************************  Jhonatan Hubbard  Medical Student   Contact via [employer] TE[employer]    *******************************    PROGRESS NOTE:     Patient is a 39y old  Male who presents with a chief complaint of Anemia (22 Oct 2024 09:15)      INTERVAL EVENTS: Bloody bowel movement with dark red blood reported yesterday afternoon.     SUBJECTIVE: Patient seen and examined at bedside. This morning, the patient is comfortable and doing well. No acute complaints. Denies fevers, chills, N/V/D, chest pain, SOB, abdominal pain.    MEDICATIONS  (STANDING):  chlorhexidine 2% Cloths 1 Application(s) Topical daily  dextrose 5%. 1000 milliLiter(s) (50 mL/Hr) IV Continuous <Continuous>  dextrose 5%. 1000 milliLiter(s) (100 mL/Hr) IV Continuous <Continuous>  dextrose 50% Injectable 25 Gram(s) IV Push once  dextrose 50% Injectable 25 Gram(s) IV Push once  dextrose 50% Injectable 12.5 Gram(s) IV Push once  glucagon  Injectable 1 milliGRAM(s) IntraMuscular once  influenza   Vaccine 0.5 milliLiter(s) IntraMuscular once  insulin lispro (ADMELOG) corrective regimen sliding scale   SubCutaneous at bedtime  insulin lispro (ADMELOG) corrective regimen sliding scale   SubCutaneous three times a day before meals  mupirocin 2% Ointment 1 Application(s) Topical two times a day  pantoprazole  Injectable 40 milliGRAM(s) IV Push two times a day  polyethylene glycol 3350 17 Gram(s) Oral daily  rosuvastatin 10 milliGRAM(s) Oral at bedtime  senna 2 Tablet(s) Oral at bedtime    MEDICATIONS  (PRN):  acetaminophen     Tablet .. 650 milliGRAM(s) Oral every 6 hours PRN Temp greater or equal to 38C (100.4F), Mild Pain (1 - 3)  aluminum hydroxide/magnesium hydroxide/simethicone Suspension 30 milliLiter(s) Oral every 4 hours PRN Dyspepsia  dextrose Oral Gel 15 Gram(s) Oral once PRN Blood Glucose LESS THAN 70 milliGRAM(s)/deciliter  melatonin 3 milliGRAM(s) Oral at bedtime PRN Insomnia  ondansetron Injectable 4 milliGRAM(s) IV Push every 8 hours PRN Nausea and/or Vomiting      CAPILLARY BLOOD GLUCOSE      POCT Blood Glucose.: 109 mg/dL (22 Oct 2024 06:30)  POCT Blood Glucose.: 104 mg/dL (22 Oct 2024 00:42)  POCT Blood Glucose.: 112 mg/dL (21 Oct 2024 21:24)  POCT Blood Glucose.: 120 mg/dL (21 Oct 2024 17:41)  POCT Blood Glucose.: 89 mg/dL (21 Oct 2024 12:28)    I&O's Summary    21 Oct 2024 07:01  -  22 Oct 2024 07:00  --------------------------------------------------------  IN: 940 mL / OUT: 700 mL / NET: 240 mL        PHYSICAL EXAM:  Vital Signs Last 24 Hrs  T(C): 36.5 (22 Oct 2024 05:14), Max: 37.1 (21 Oct 2024 21:06)  T(F): 97.7 (22 Oct 2024 05:14), Max: 98.7 (21 Oct 2024 21:06)  HR: 78 (22 Oct 2024 05:14) (78 - 94)  BP: 115/78 (22 Oct 2024 05:14) (112/72 - 115/78)  BP(mean): --  RR: 18 (22 Oct 2024 05:14) (17 - 18)  SpO2: 100% (22 Oct 2024 05:14) (99% - 100%)    Parameters below as of 22 Oct 2024 05:14  Patient On (Oxygen Delivery Method): room air        GENERAL: NAD, lying in bed comfortably  HEAD: Atraumatic, normocephalic  EYES: EOMI, PERRLA, conjunctiva and sclera clear  ENT: Moist mucous membranes  NECK: Supple, no JVD  HEART: S1, S2, Regular rate and rhythm, no murmurs, rubs, or gallops  LUNGS: Unlabored respirations, clear to auscultation bilaterally, no crackles, wheezing, or rhonchi  ABDOMEN: Soft, nontender, nondistended, +BS  EXTREMITIES: 2+ peripheral pulses bilaterally. No clubbing, cyanosis, or edema  NERVOUS SYSTEM:  A&Ox3, no focal deficits   SKIN: No rashes or lesions    LABS:                        9.2    7.13  )-----------( 250      ( 22 Oct 2024 05:54 )             30.8     10-22    138  |  102  |  17  ----------------------------<  90  4.1   |  21[L]  |  0.63    Ca    9.3      22 Oct 2024 05:54  Phos  2.9     10-21  Mg     2.00     10-21    TPro  7.7  /  Alb  4.4  /  TBili  <0.2  /  DBili  x   /  AST  19  /  ALT  18  /  AlkPhos  80  10-20    PT/INR - ( 20 Oct 2024 12:33 )   PT: 12.7 sec;   INR: 1.10 ratio         PTT - ( 20 Oct 2024 12:33 )  PTT:31.5 sec      Urinalysis Basic - ( 22 Oct 2024 05:54 )    Color: x / Appearance: x / SG: x / pH: x  Gluc: 90 mg/dL / Ketone: x  / Bili: x / Urobili: x   Blood: x / Protein: x / Nitrite: x   Leuk Esterase: x / RBC: x / WBC x   Sq Epi: x / Non Sq Epi: x / Bacteria: x    
Call received from nurse that patient had bloody BM. Lab work which includes cbc sent to lab  Will continue to monitor H/H levels. DR Velazquez being notified. Continue to monitor
Patient is a 39y old  Male who presents with a chief complaint of Anemia (22 Oct 2024 09:49)      DATE OF SERVICE: 10-22-24 @ 14:03    SUBJECTIVE / OVERNIGHT EVENTS: overnight events noted    ROS:  Resp: No cough no sputum production  CVS: No chest pain no palpitations no orthopnea  GI: no N/V/D  no BRBPR       MEDICATIONS  (STANDING):  chlorhexidine 2% Cloths 1 Application(s) Topical daily  dextrose 5%. 1000 milliLiter(s) (50 mL/Hr) IV Continuous <Continuous>  dextrose 5%. 1000 milliLiter(s) (100 mL/Hr) IV Continuous <Continuous>  dextrose 50% Injectable 25 Gram(s) IV Push once  dextrose 50% Injectable 12.5 Gram(s) IV Push once  dextrose 50% Injectable 25 Gram(s) IV Push once  glucagon  Injectable 1 milliGRAM(s) IntraMuscular once  influenza   Vaccine 0.5 milliLiter(s) IntraMuscular once  insulin lispro (ADMELOG) corrective regimen sliding scale   SubCutaneous at bedtime  insulin lispro (ADMELOG) corrective regimen sliding scale   SubCutaneous three times a day before meals  mupirocin 2% Ointment 1 Application(s) Topical two times a day  pantoprazole  Injectable 40 milliGRAM(s) IV Push two times a day  polyethylene glycol 3350 17 Gram(s) Oral daily  rosuvastatin 10 milliGRAM(s) Oral at bedtime  senna 2 Tablet(s) Oral at bedtime    MEDICATIONS  (PRN):  acetaminophen     Tablet .. 650 milliGRAM(s) Oral every 6 hours PRN Temp greater or equal to 38C (100.4F), Mild Pain (1 - 3)  aluminum hydroxide/magnesium hydroxide/simethicone Suspension 30 milliLiter(s) Oral every 4 hours PRN Dyspepsia  dextrose Oral Gel 15 Gram(s) Oral once PRN Blood Glucose LESS THAN 70 milliGRAM(s)/deciliter  melatonin 3 milliGRAM(s) Oral at bedtime PRN Insomnia  ondansetron Injectable 4 milliGRAM(s) IV Push every 8 hours PRN Nausea and/or Vomiting        CAPILLARY BLOOD GLUCOSE      POCT Blood Glucose.: 84 mg/dL (22 Oct 2024 12:31)  POCT Blood Glucose.: 109 mg/dL (22 Oct 2024 06:30)  POCT Blood Glucose.: 104 mg/dL (22 Oct 2024 00:42)  POCT Blood Glucose.: 112 mg/dL (21 Oct 2024 21:24)  POCT Blood Glucose.: 120 mg/dL (21 Oct 2024 17:41)    I&O's Summary    21 Oct 2024 07:01  -  22 Oct 2024 07:00  --------------------------------------------------------  IN: 940 mL / OUT: 700 mL / NET: 240 mL    22 Oct 2024 07:01  -  22 Oct 2024 14:03  --------------------------------------------------------  IN: 0 mL / OUT: 200 mL / NET: -200 mL        Vital Signs Last 24 Hrs  T(C): 37 (22 Oct 2024 10:28), Max: 37.1 (21 Oct 2024 21:06)  T(F): 98.6 (22 Oct 2024 10:28), Max: 98.7 (21 Oct 2024 21:06)  HR: 83 (22 Oct 2024 10:28) (78 - 94)  BP: 118/80 (22 Oct 2024 10:28) (113/75 - 118/80)  BP(mean): --  RR: 18 (22 Oct 2024 10:28) (18 - 18)  SpO2: 100% (22 Oct 2024 10:28) (99% - 100%)    PHYSICAL EXAM:  NECK: Supple  CHEST/LUNG: clear   HEART: S1 S2; no murmurs   ABDOMEN: Soft, Nontender  EXTREMITIES:   no edema  NEUROLOGY: AO x 3 non-focal  SKIN: No rashes or lesions    LABS:                        9.2    7.13  )-----------( 250      ( 22 Oct 2024 05:54 )             30.8     10-22    138  |  102  |  17  ----------------------------<  90  4.1   |  21[L]  |  0.63    Ca    9.3      22 Oct 2024 05:54  Phos  2.9     10-21  Mg     2.00     10-21            Urinalysis Basic - ( 22 Oct 2024 05:54 )    Color: x / Appearance: x / SG: x / pH: x  Gluc: 90 mg/dL / Ketone: x  / Bili: x / Urobili: x   Blood: x / Protein: x / Nitrite: x   Leuk Esterase: x / RBC: x / WBC x   Sq Epi: x / Non Sq Epi: x / Bacteria: x          All consultant(s) notes reviewed and care discussed with other providers        Contact Number, Dr Velazquez [phone]
Patient is a 39y old  Male who presents with a chief complaint of Anemia (23 Oct 2024 07:46)      DATE OF SERVICE: 10-23-24 @ 11:09    SUBJECTIVE / OVERNIGHT EVENTS: overnight events noted    ROS:  Resp: No cough no sputum production  CVS: No chest pain no palpitations no orthopnea  GI: no N/V + BRBPR after enema      MEDICATIONS  (STANDING):  chlorhexidine 2% Cloths 1 Application(s) Topical daily  dextrose 5%. 1000 milliLiter(s) (100 mL/Hr) IV Continuous <Continuous>  dextrose 5%. 1000 milliLiter(s) (50 mL/Hr) IV Continuous <Continuous>  dextrose 50% Injectable 25 Gram(s) IV Push once  dextrose 50% Injectable 25 Gram(s) IV Push once  dextrose 50% Injectable 12.5 Gram(s) IV Push once  glucagon  Injectable 1 milliGRAM(s) IntraMuscular once  influenza   Vaccine 0.5 milliLiter(s) IntraMuscular once  insulin lispro (ADMELOG) corrective regimen sliding scale   SubCutaneous three times a day before meals  insulin lispro (ADMELOG) corrective regimen sliding scale   SubCutaneous at bedtime  mupirocin 2% Ointment 1 Application(s) Topical two times a day  pantoprazole  Injectable 40 milliGRAM(s) IV Push two times a day  polyethylene glycol 3350 17 Gram(s) Oral daily  rosuvastatin 10 milliGRAM(s) Oral at bedtime  senna 2 Tablet(s) Oral at bedtime    MEDICATIONS  (PRN):  acetaminophen     Tablet .. 650 milliGRAM(s) Oral every 6 hours PRN Temp greater or equal to 38C (100.4F), Mild Pain (1 - 3)  aluminum hydroxide/magnesium hydroxide/simethicone Suspension 30 milliLiter(s) Oral every 4 hours PRN Dyspepsia  dextrose Oral Gel 15 Gram(s) Oral once PRN Blood Glucose LESS THAN 70 milliGRAM(s)/deciliter  melatonin 3 milliGRAM(s) Oral at bedtime PRN Insomnia  ondansetron Injectable 4 milliGRAM(s) IV Push every 8 hours PRN Nausea and/or Vomiting        CAPILLARY BLOOD GLUCOSE      POCT Blood Glucose.: 119 mg/dL (23 Oct 2024 05:48)  POCT Blood Glucose.: 139 mg/dL (22 Oct 2024 21:02)  POCT Blood Glucose.: 130 mg/dL (22 Oct 2024 17:38)  POCT Blood Glucose.: 84 mg/dL (22 Oct 2024 12:31)    I&O's Summary    22 Oct 2024 07:01  -  23 Oct 2024 07:00  --------------------------------------------------------  IN: 950 mL / OUT: 1050 mL / NET: -100 mL        Vital Signs Last 24 Hrs  T(C): 37.1 (23 Oct 2024 09:57), Max: 37.1 (23 Oct 2024 09:57)  T(F): 98.7 (23 Oct 2024 09:57), Max: 98.7 (23 Oct 2024 09:57)  HR: 93 (23 Oct 2024 09:57) (89 - 99)  BP: 132/74 (23 Oct 2024 09:57) (117/74 - 133/86)  BP(mean): --  RR: 18 (23 Oct 2024 09:57) (18 - 18)  SpO2: 99% (23 Oct 2024 09:57) (99% - 100%)    PHYSICAL EXAM:  CHEST/LUNG: clear  HEART: S1 S2; no murmurs   ABDOMEN: Soft, Nontender  EXTREMITIES:    no edema  NEUROLOGY: AO x 3 non-focal  SKIN: No rashes or lesions    LABS:                        9.3    6.73  )-----------( 265      ( 23 Oct 2024 05:35 )             31.9     10-23    137  |  100  |  17  ----------------------------<  92  4.0   |  21[L]  |  0.56    Ca    9.5      23 Oct 2024 05:35  Phos  3.2     10-23  Mg     2.10     10-23    TPro  8.4[H]  /  Alb  4.5  /  TBili  0.6  /  DBili  <0.2  /  AST  18  /  ALT  19  /  AlkPhos  77  10-23    PT/INR - ( 23 Oct 2024 05:35 )   PT: 13.1 sec;   INR: 1.10 ratio         PTT - ( 23 Oct 2024 05:35 )  PTT:32.2 sec      Urinalysis Basic - ( 23 Oct 2024 05:35 )    Color: x / Appearance: x / SG: x / pH: x  Gluc: 92 mg/dL / Ketone: x  / Bili: x / Urobili: x   Blood: x / Protein: x / Nitrite: x   Leuk Esterase: x / RBC: x / WBC x   Sq Epi: x / Non Sq Epi: x / Bacteria: x          All consultant(s) notes reviewed and care discussed with other providers        Contact Number, Dr Velazquez [phone]
Patient is a 39y old  Male who presents with a chief complaint of Anemia (21 Oct 2024 14:54)  patient not known to me, assigned this AM to assume care  chart reviewed and events thus far noted   admitted overnight by hospitalist colleague     DATE OF SERVICE: 10-21-24 @ 15:21    SUBJECTIVE / OVERNIGHT EVENTS: overnight events noted    ROS:  Resp: No cough no sputum production  CVS: No chest pain no palpitations no orthopnea  GI: no N/V/D          MEDICATIONS  (STANDING):  chlorhexidine 2% Cloths 1 Application(s) Topical daily  dextrose 5%. 1000 milliLiter(s) (50 mL/Hr) IV Continuous <Continuous>  dextrose 5%. 1000 milliLiter(s) (100 mL/Hr) IV Continuous <Continuous>  dextrose 50% Injectable 25 Gram(s) IV Push once  dextrose 50% Injectable 12.5 Gram(s) IV Push once  dextrose 50% Injectable 25 Gram(s) IV Push once  glucagon  Injectable 1 milliGRAM(s) IntraMuscular once  influenza   Vaccine 0.5 milliLiter(s) IntraMuscular once  insulin lispro (ADMELOG) corrective regimen sliding scale   SubCutaneous three times a day before meals  insulin lispro (ADMELOG) corrective regimen sliding scale   SubCutaneous at bedtime  pantoprazole  Injectable 40 milliGRAM(s) IV Push two times a day  polyethylene glycol 3350 17 Gram(s) Oral daily  rosuvastatin 10 milliGRAM(s) Oral at bedtime  senna 2 Tablet(s) Oral at bedtime    MEDICATIONS  (PRN):  acetaminophen     Tablet .. 650 milliGRAM(s) Oral every 6 hours PRN Temp greater or equal to 38C (100.4F), Mild Pain (1 - 3)  aluminum hydroxide/magnesium hydroxide/simethicone Suspension 30 milliLiter(s) Oral every 4 hours PRN Dyspepsia  dextrose Oral Gel 15 Gram(s) Oral once PRN Blood Glucose LESS THAN 70 milliGRAM(s)/deciliter  melatonin 3 milliGRAM(s) Oral at bedtime PRN Insomnia  ondansetron Injectable 4 milliGRAM(s) IV Push every 8 hours PRN Nausea and/or Vomiting        CAPILLARY BLOOD GLUCOSE      POCT Blood Glucose.: 89 mg/dL (21 Oct 2024 12:28)  POCT Blood Glucose.: 95 mg/dL (21 Oct 2024 08:35)  POCT Blood Glucose.: 105 mg/dL (20 Oct 2024 21:22)  POCT Blood Glucose.: 142 mg/dL (20 Oct 2024 18:01)    I&O's Summary    20 Oct 2024 07:01  -  21 Oct 2024 07:00  --------------------------------------------------------  IN: 240 mL / OUT: 150 mL / NET: 90 mL    21 Oct 2024 07:01  -  21 Oct 2024 15:21  --------------------------------------------------------  IN: 350 mL / OUT: 100 mL / NET: 250 mL        Vital Signs Last 24 Hrs  T(C): 36.7 (21 Oct 2024 10:20), Max: 37.3 (20 Oct 2024 21:48)  T(F): 98.1 (21 Oct 2024 10:20), Max: 99.1 (20 Oct 2024 21:48)  HR: 90 (21 Oct 2024 10:20) (90 - 101)  BP: 112/72 (21 Oct 2024 10:20) (111/69 - 128/84)  BP(mean): --  RR: 17 (21 Oct 2024 10:20) (16 - 18)  SpO2: 100% (21 Oct 2024 10:20) (100% - 100%)    PHYSICAL EXAM:  GENERAL: in no apparent distress  HEAD:  Atraumatic, Normocephalic  EYES: EOMI, PERRLA, sclera clear  NECK: Supple, No JVD  CHEST/LUNG: clear b/l, no wheeze  HEART: S1 S2; no murmurs appreciated  ABDOMEN: Soft, Nontender, Bowel sounds present  EXTREMITIES:  No clubbing or cyanosis,  no edema  NEUROLOGY: AO x 3 non-focal  SKIN: No rashes or lesions    LABS:                        7.7    9.55  )-----------( 225      ( 21 Oct 2024 00:30 )             26.2     10-21    137  |  105  |  9   ----------------------------<  90  3.7   |  21[L]  |  0.57    Ca    8.8      21 Oct 2024 00:30  Phos  2.9     10-21  Mg     2.00     10-21    TPro  7.7  /  Alb  4.4  /  TBili  <0.2  /  DBili  x   /  AST  19  /  ALT  18  /  AlkPhos  80  10-20    PT/INR - ( 20 Oct 2024 12:33 )   PT: 12.7 sec;   INR: 1.10 ratio         PTT - ( 20 Oct 2024 12:33 )  PTT:31.5 sec      Urinalysis Basic - ( 21 Oct 2024 00:30 )    Color: x / Appearance: x / SG: x / pH: x  Gluc: 90 mg/dL / Ketone: x  / Bili: x / Urobili: x   Blood: x / Protein: x / Nitrite: x   Leuk Esterase: x / RBC: x / WBC x   Sq Epi: x / Non Sq Epi: x / Bacteria: x          All consultant(s) notes reviewed and care discussed with other providers        Contact Number, Dr Velazquez [phone]
Patient is a 39y old  Male who presents with a chief complaint of Anemia (24 Oct 2024 09:00)      DATE OF SERVICE: 10-24-24 @ 11:56    SUBJECTIVE / OVERNIGHT EVENTS: overnight events noted    ROS:  Resp: No cough no sputum production  CVS: No chest pain no palpitations no orthopnea  GI: no N/V/D  : no dysuria, no hematuria  no BRBPR overnight          MEDICATIONS  (STANDING):  chlorhexidine 2% Cloths 1 Application(s) Topical daily  dextrose 5%. 1000 milliLiter(s) (50 mL/Hr) IV Continuous <Continuous>  dextrose 5%. 1000 milliLiter(s) (100 mL/Hr) IV Continuous <Continuous>  dextrose 50% Injectable 25 Gram(s) IV Push once  dextrose 50% Injectable 25 Gram(s) IV Push once  dextrose 50% Injectable 12.5 Gram(s) IV Push once  glucagon  Injectable 1 milliGRAM(s) IntraMuscular once  influenza   Vaccine 0.5 milliLiter(s) IntraMuscular once  insulin lispro (ADMELOG) corrective regimen sliding scale   SubCutaneous three times a day before meals  insulin lispro (ADMELOG) corrective regimen sliding scale   SubCutaneous at bedtime  iron sucrose Injectable 200 milliGRAM(s) IV Push once  mupirocin 2% Ointment 1 Application(s) Topical two times a day  pantoprazole    Tablet 40 milliGRAM(s) Oral before breakfast  polyethylene glycol 3350 17 Gram(s) Oral daily  rosuvastatin 10 milliGRAM(s) Oral at bedtime  senna 2 Tablet(s) Oral at bedtime    MEDICATIONS  (PRN):  acetaminophen     Tablet .. 650 milliGRAM(s) Oral every 6 hours PRN Temp greater or equal to 38C (100.4F), Mild Pain (1 - 3)  aluminum hydroxide/magnesium hydroxide/simethicone Suspension 30 milliLiter(s) Oral every 4 hours PRN Dyspepsia  dextrose Oral Gel 15 Gram(s) Oral once PRN Blood Glucose LESS THAN 70 milliGRAM(s)/deciliter  melatonin 3 milliGRAM(s) Oral at bedtime PRN Insomnia  ondansetron Injectable 4 milliGRAM(s) IV Push every 8 hours PRN Nausea and/or Vomiting        CAPILLARY BLOOD GLUCOSE      POCT Blood Glucose.: 152 mg/dL (24 Oct 2024 08:42)  POCT Blood Glucose.: 140 mg/dL (23 Oct 2024 21:08)  POCT Blood Glucose.: 211 mg/dL (23 Oct 2024 17:27)  POCT Blood Glucose.: 108 mg/dL (23 Oct 2024 13:55)    I&O's Summary    23 Oct 2024 07:01  -  24 Oct 2024 07:00  --------------------------------------------------------  IN: 0 mL / OUT: 0 mL / NET: 0 mL        Vital Signs Last 24 Hrs  T(C): 36.9 (24 Oct 2024 10:00), Max: 37.5 (24 Oct 2024 09:58)  T(F): 98.5 (24 Oct 2024 10:00), Max: 99.5 (24 Oct 2024 09:58)  HR: 90 (24 Oct 2024 09:58) (74 - 101)  BP: 137/85 (24 Oct 2024 09:58) (110/66 - 137/85)  BP(mean): --  RR: 18 (24 Oct 2024 09:58) (16 - 20)  SpO2: 100% (24 Oct 2024 09:58) (99% - 100%)    PHYSICAL EXAM:  GENERAL: in no apparent distress  HEAD:  Atraumatic, Normocephalic  EYES: EOMI, PERRLA, sclera clear  NECK: Supple, No JVD  CHEST/LUNG: clear b/l, no wheeze  HEART: S1 S2; no murmurs appreciated  ABDOMEN: Soft, Nontender, Bowel sounds present  EXTREMITIES:  No clubbing or cyanosis,  no edema  NEUROLOGY: AO x 3 non-focal  SKIN: No rashes or lesions    LABS:                        8.3    6.67  )-----------( 255      ( 24 Oct 2024 06:20 )             28.3     10-24    136  |  100  |  15  ----------------------------<  125[H]  4.0   |  23  |  0.57    Ca    9.1      24 Oct 2024 06:20  Phos  3.0     10-24  Mg     1.90     10-24    TPro  7.4  /  Alb  4.3  /  TBili  0.4  /  DBili  x   /  AST  14  /  ALT  14  /  AlkPhos  65  10-24    PT/INR - ( 23 Oct 2024 05:35 )   PT: 13.1 sec;   INR: 1.10 ratio         PTT - ( 23 Oct 2024 05:35 )  PTT:32.2 sec      Urinalysis Basic - ( 24 Oct 2024 06:20 )    Color: x / Appearance: x / SG: x / pH: x  Gluc: 125 mg/dL / Ketone: x  / Bili: x / Urobili: x   Blood: x / Protein: x / Nitrite: x   Leuk Esterase: x / RBC: x / WBC x   Sq Epi: x / Non Sq Epi: x / Bacteria: x          All consultant(s) notes reviewed and care discussed with other providers        Contact Number, Dr Velazquez [phone]

## 2024-10-24 NOTE — DISCHARGE NOTE NURSING/CASE MANAGEMENT/SOCIAL WORK - PATIENT PORTAL LINK FT
You can access the FollowMyHealth Patient Portal offered by [facility] by registering at the following website: [email or web address]. By joining [facility]’s FollowMyHealth portal, you will also be able to view your health information using other applications (apps) compatible with our system.

## 2024-10-24 NOTE — DISCHARGE NOTE PROVIDER - NSDCMRMEDTOKEN_GEN_ALL_CORE_FT
Janumet  mg-1000 mg oral tablet, extended release: 1 tab(s) orally once a day  Jardiance 25 mg oral tablet: 1 tab(s) orally once a day  Protonix 40 mg oral delayed release tablet: 1 tab(s) orally once a day  rosuvastatin 10 mg oral capsule: 1 cap(s) orally once a day   bisacodyl 5 mg oral delayed release tablet: 1 tab(s) orally every 12 hours  ferrous sulfate 325 mg (65 mg elemental iron) oral tablet: 1 tab(s) orally once a day  Janumet  mg-1000 mg oral tablet, extended release: 1 tab(s) orally once a day  Jardiance 25 mg oral tablet: 1 tab(s) orally once a day  polyethylene glycol 3350 oral powder for reconstitution: 17 gram(s) orally once a day  Protonix 40 mg oral delayed release tablet: 1 tab(s) orally once a day  rosuvastatin 10 mg oral capsule: 1 cap(s) orally once a day  senna leaf extract oral tablet: 2 tab(s) orally once a day (at bedtime)  Vitamin C 250 mg oral tablet, chewable: 1 tab(s) chewed once a day

## 2024-10-24 NOTE — PROGRESS NOTE ADULT - NSPROGADDITIONALINFOA_GEN_ALL_CORE
discussed with patient, expresses understanding of treatment plans.  discussed with patient's mother at bedside in detail (she is an RN)  discussed with GI attending
discussed with patient, expresses understanding of treatment plans.  discussed with patient's family at bedside in detail (mother and wife)
stable for discharge  discussed with patient, expresses understanding of treatment plans.  discussed with covering ACP   discussed with patient's mother at bedside in detail

## 2024-10-24 NOTE — DISCHARGE NOTE PROVIDER - CARE PROVIDER_API CALL
Fritz Sethi (DO)  Surgery  [address], [address]  [place] [address]  Phone: [phone]  Fax: [phone]  Follow Up Time: 2 weeks

## 2024-10-24 NOTE — PROGRESS NOTE ADULT - PROVIDER SPECIALTY LIST ADULT
Internal Medicine
Gastroenterology
Colorectal Surgery
Surgery
Colorectal Surgery
Gastroenterology
Internal Medicine

## 2024-10-24 NOTE — PROGRESS NOTE ADULT - PROBLEM SELECTOR PLAN 2
actively bleeding with stools often per patient  with occasional spurting  colorectal surgery consultation appreciated  flex sig tomorrow
actively bleeding with stools often per patient  with occasional spurting  colorectal surgery consultation appreciated  flex sig and EGD today
actively bleeding with stools often per patient  with occasional spurting  colorectal surgery consultation requested
actively bleeding with stools often per patient  with occasional spurting  colorectal surgery consultation appreciated  flex sig and EGD today

## 2024-10-24 NOTE — PROGRESS NOTE ADULT - PROBLEM SELECTOR PLAN 4
HbA1C 6.2  finger sticks with short acting insulin sliding scale  no oral meds  diabetic diet  monitor for hypoglycemia
HbA1C 6.2  finger sticks with short acting insulin sliding scale  no oral meds  resume Jardiance and Janumet on discharge
HbA1C  finger sticks with short acting insulin sliding scale  no oral meds  diabetic diet  monitor for hypoglycemia
HbA1C 6.2  finger sticks with short acting insulin sliding scale  no oral meds  diabetic diet  monitor for hypoglycemia

## 2024-10-24 NOTE — PROGRESS NOTE ADULT - PROBLEM SELECTOR PLAN 1
stable hemoglobin   no BRBPR  s/o EGD completely normal   sigmoidoscopy done s/p banding  discharge with outpatient follow up Dr Sethi  continue pantoprazole 40 po qd on discharge  Venofer 200 mg IV x 1 prior to discharge
confirmed by iron studies  s/p 2 units  hemoglobin 7.7  will repeat  add B12 folate as RDW is elevated
stable hemoglobin > 9  continue to monitor daily  continue PPI BID   B12 folate normal
confirmed by iron studies  s/p 2 units  hemoglobin > 9 today  continue to monitor daily  continue PPI BID   B12 folate normal

## 2024-10-24 NOTE — PROGRESS NOTE ADULT - ASSESSMENT
Patient is a 39 year old male with a past medical history of peptic ulcer disease (s/p endoscopy 10/2023), internal hemorrhoids, KIN, T2DM, and HLD presenting to the hospital for labs significant for anemia noted at his PCP visit on 10/18/24.    #iron deficiency anemia  *s/p 2u pRBC 6.1 --> 7.7  *Hg improved to 9.2 today   #Hx of PUD - DU's on EGD 10/2023 (EGD path neg for H. pylori) - no longer on PPI  #NSAID use iso sciatica pain  #Intermittent hematochezia  #Chronic constipation  #internal hemorrhoids  #NIDDM on Jardiance/Janumet - last dose of both 10/20 AM  S/p EGD/flex sig 10/23: EGD wnl, biopsies taken to r/o H pylori. Flex sig w/ large internal hemorrhoids w/ stigmata of recent bleeding. RBL 2x were placed w/ cessation of bleeding.  Post-procedure no evidence of further hematochezia or evidence of GI bleeding.    Recommendations:  - No contraindications to discharge from GI standpoint  - Follow up pathology  -will sign off at this time, however please call back with questions or should any worsening/persistent issues arise.  Please provide patient with Gastroenterology Clinic to confirm appointment; [phone] (Faculty Practice at [address] [place] [address]) or [phone] ([facility] at [address] [place] [address]) or [phone] ([facility] at [address] [place] [address]).    Note incomplete until finalized by attending signature/attestation.    Edis Singh  GI/Hepatology Fellow, PGY-4    MONDAY-FRIDAY 8AM-5PM:  Please message via [employer] or email [email or web address] OR [email or web address]     On Weekends/Holidays (All day) and Weekdays after 5 PM to 8 AM  For nonurgent consults please email:  Please email [email or web address] OR [email or web address]  For urgent consults:  Please contact on call GI team. See Amion schedule ([facility]), Pod Inns paging system ([facility]), or call hospital  ([facility])

## 2024-10-24 NOTE — DISCHARGE NOTE NURSING/CASE MANAGEMENT/SOCIAL WORK - NSDCPEFALRISK_GEN_ALL_CORE
For information on Fall & Injury Prevention, visit: [email or web address] OR  [email or web address] OR  https://www.cdc.gov/steadi/patient.html

## 2024-10-24 NOTE — DISCHARGE NOTE PROVIDER - HOSPITAL COURSE
39M with T2DM not on insulin, duodenal ulcer, HLD who presents with anemia likely 2/2 GI source given hemorrhoidal bleeding and history of duodenal ulcers. 2U pRBC given in ED  -s/p EGD/flex sig 10/23    Non-bleeding external Hemorrhoids found on perianal exam.Large, friable Internal hemorrhoids found on  retroflexion. 2 bands were placed w/o further bleeding   post procedure.No specimens collected.     39M with T2DM not on insulin, duodenal ulcer, HLD who presents with anemia likely 2/2 GI source given hemorrhoidal bleeding and history of duodenal ulcers  Iron deficiency anemia.   -Hb 6.1, MCV 65, serum iron 10 with saturation 2%  - 2U pRBC given in ED  -s/p EGD/flex sig 10/23    Non-bleeding external Hemorrhoids found on perianal exam. Large, friable Internal hemorrhoids found on  retroflexion. 2 bands were placed w/o further bleeding   post procedure. No specimens collected.    Rectal bleeding.   -2/2 hemorrhoids  - Bowel regimen

## 2024-10-24 NOTE — PROGRESS NOTE ADULT - ATTENDING COMMENTS
DATE OF SERVICE: 10-23-24 @ 14:20    S/p flex sig with GI today, RBL performed on 2 inflamed hemorrhoids  Continue bowel regimen  Fu outpatient if continued bleeding
Agree with above.
Agree with above.    Patient was seen by our team on 10-22-24. I agree with the findings and plan of care as documented in the fellow/resident/medical student/physician assistant/nurse practitioner.    Today we are treating the patient for:   Acute blood loss anemia  Melena    ***General note with plan / recommendation:   History of PUD status post PPI, now off medications, presenting with melena.  On last colonoscopy found hemorrhoids (done in 2023)  Plan on EGD and sigmoidosocopy (per surgical team request)   Continue PPIs. Plan as above.       Billing:  I have reviewed records from labs, imaging, prior endoscopy.    Other:  - Thank you for involving us in the care of this patient. If you have any questions regarding the plan of care please do not hesitate to call us back.  - For any questions on Monday - Friday 8 am to 5pm please message via [employer] or email [email or web address] OR [email or web address]   - For any Urgent off hours consults please contact on- call GI team. See Amion schedule ([facility]), Critical Links paging system ([facility]), or call hospital  ([facility])  - Rest of management as per primary team

## 2024-10-24 NOTE — DISCHARGE NOTE NURSING/CASE MANAGEMENT/SOCIAL WORK - FINANCIAL ASSISTANCE
[facility] provides services at a reduced cost to those who are determined to be eligible through [facility]’s financial assistance program. Information regarding [facility]’s financial assistance program can be found by going to [email or web address] or by calling [phone].

## 2024-10-24 NOTE — DISCHARGE NOTE PROVIDER - NSDCHC_MEDRECSTATUS_GEN_ALL_CORE
Admission Reconciliation is Completed  Discharge Reconciliation is Not Complete Detail Level: Simple Comment: Eczema Admission Reconciliation is Completed  Discharge Reconciliation is Completed

## 2024-10-25 LAB — SURGICAL PATHOLOGY STUDY: SIGNIFICANT CHANGE UP

## (undated) DEVICE — BASIN EMESIS 10IN GRADUATED MAUVE

## (undated) DEVICE — ELCTR GROUNDING PAD ADULT COVIDIEN

## (undated) DEVICE — CONTAINER FORMALIN 10% 20ML

## (undated) DEVICE — ELCTR ECG CONDUCTIVE ADHESIVE

## (undated) DEVICE — LUBRICATING JELLY HR ONE SHOT 3G

## (undated) DEVICE — CATH IV SAFE BC 22G X 1" (BLUE)

## (undated) DEVICE — DRSG CURITY GAUZE SPONGE 4 X 4" 12-PLY NON-STERILE

## (undated) DEVICE — CLAMP BX HOT RAD JAW 3

## (undated) DEVICE — BIOPSY FORCEP RADIAL JAW 4 STANDARD WITH NEEDLE

## (undated) DEVICE — BITE BLOCK ADULT 20 X 27MM (GREEN)

## (undated) DEVICE — CONTAINER FORMALIN 80ML YELLOW

## (undated) DEVICE — TUBING MEDI-VAC W MAXIGRIP CONNECTORS 1/4"X6'

## (undated) DEVICE — LINE BREATHE SAMPLNG

## (undated) DEVICE — GOWN LG

## (undated) DEVICE — DENTURE CUP PINK

## (undated) DEVICE — TUBING IV SET GRAVITY 3Y 100" MACRO

## (undated) DEVICE — TUBING SUCTION NONCONDUCTIVE 6MM X 12FT

## (undated) DEVICE — DRSG BANDAID 0.75X3"

## (undated) DEVICE — BIOPSY FORCEP COLD DISP

## (undated) DEVICE — PACK IV START WITH CHG

## (undated) DEVICE — SALIVA EJECTOR (BLUE)

## (undated) DEVICE — DRSG 2X2

## (undated) DEVICE — UNDERPAD LINEN SAVER 17 X 24"